# Patient Record
Sex: MALE | Race: BLACK OR AFRICAN AMERICAN | NOT HISPANIC OR LATINO | Employment: OTHER | URBAN - METROPOLITAN AREA
[De-identification: names, ages, dates, MRNs, and addresses within clinical notes are randomized per-mention and may not be internally consistent; named-entity substitution may affect disease eponyms.]

---

## 2021-09-07 DIAGNOSIS — R53.1 WEAKNESS GENERALIZED: Primary | ICD-10-CM

## 2021-09-30 ENCOUNTER — CLINICAL SUPPORT (OUTPATIENT)
Dept: REHABILITATION | Facility: HOSPITAL | Age: 61
End: 2021-09-30
Attending: NURSE PRACTITIONER
Payer: MEDICAID

## 2021-09-30 DIAGNOSIS — R53.1 WEAKNESS GENERALIZED: ICD-10-CM

## 2021-09-30 DIAGNOSIS — M25.60 DECREASED RANGE OF MOTION: ICD-10-CM

## 2021-09-30 DIAGNOSIS — M25.511 CHRONIC PAIN OF BOTH SHOULDERS: ICD-10-CM

## 2021-09-30 DIAGNOSIS — R29.898 DECREASED GRIP STRENGTH: ICD-10-CM

## 2021-09-30 DIAGNOSIS — G89.29 CHRONIC PAIN OF BOTH SHOULDERS: ICD-10-CM

## 2021-09-30 DIAGNOSIS — M25.512 CHRONIC PAIN OF BOTH SHOULDERS: ICD-10-CM

## 2021-09-30 PROCEDURE — 97110 THERAPEUTIC EXERCISES: CPT

## 2021-09-30 PROCEDURE — 97167 OT EVAL HIGH COMPLEX 60 MIN: CPT | Performed by: OCCUPATIONAL THERAPIST

## 2021-09-30 PROCEDURE — 97161 PT EVAL LOW COMPLEX 20 MIN: CPT

## 2021-10-06 ENCOUNTER — CLINICAL SUPPORT (OUTPATIENT)
Dept: REHABILITATION | Facility: HOSPITAL | Age: 61
End: 2021-10-06
Payer: MEDICAID

## 2021-10-06 DIAGNOSIS — Z74.09 DECREASED STRENGTH, ENDURANCE, AND MOBILITY: ICD-10-CM

## 2021-10-06 DIAGNOSIS — R53.1 DECREASED STRENGTH, ENDURANCE, AND MOBILITY: ICD-10-CM

## 2021-10-06 DIAGNOSIS — R68.89 DECREASED STRENGTH, ENDURANCE, AND MOBILITY: ICD-10-CM

## 2021-10-06 PROCEDURE — 97110 THERAPEUTIC EXERCISES: CPT

## 2021-10-12 ENCOUNTER — CLINICAL SUPPORT (OUTPATIENT)
Dept: REHABILITATION | Facility: HOSPITAL | Age: 61
End: 2021-10-12
Payer: MEDICAID

## 2021-10-12 DIAGNOSIS — G89.29 CHRONIC PAIN OF BOTH SHOULDERS: ICD-10-CM

## 2021-10-12 DIAGNOSIS — R29.898 DECREASED GRIP STRENGTH: ICD-10-CM

## 2021-10-12 DIAGNOSIS — M25.511 CHRONIC PAIN OF BOTH SHOULDERS: ICD-10-CM

## 2021-10-12 DIAGNOSIS — M25.512 CHRONIC PAIN OF BOTH SHOULDERS: ICD-10-CM

## 2021-10-12 DIAGNOSIS — M25.60 DECREASED RANGE OF MOTION: ICD-10-CM

## 2021-10-12 DIAGNOSIS — R53.1 DECREASED STRENGTH, ENDURANCE, AND MOBILITY: ICD-10-CM

## 2021-10-12 DIAGNOSIS — R68.89 DECREASED STRENGTH, ENDURANCE, AND MOBILITY: ICD-10-CM

## 2021-10-12 DIAGNOSIS — Z74.09 DECREASED STRENGTH, ENDURANCE, AND MOBILITY: ICD-10-CM

## 2021-10-12 PROCEDURE — 97110 THERAPEUTIC EXERCISES: CPT | Performed by: OCCUPATIONAL THERAPIST

## 2021-10-12 PROCEDURE — 97530 THERAPEUTIC ACTIVITIES: CPT | Performed by: OCCUPATIONAL THERAPIST

## 2021-10-13 ENCOUNTER — CLINICAL SUPPORT (OUTPATIENT)
Dept: REHABILITATION | Facility: HOSPITAL | Age: 61
End: 2021-10-13
Payer: MEDICAID

## 2021-10-13 DIAGNOSIS — R68.89 DECREASED STRENGTH, ENDURANCE, AND MOBILITY: ICD-10-CM

## 2021-10-13 DIAGNOSIS — R53.1 DECREASED STRENGTH, ENDURANCE, AND MOBILITY: ICD-10-CM

## 2021-10-13 DIAGNOSIS — Z74.09 DECREASED STRENGTH, ENDURANCE, AND MOBILITY: ICD-10-CM

## 2021-10-13 PROCEDURE — 97110 THERAPEUTIC EXERCISES: CPT

## 2021-10-15 ENCOUNTER — CLINICAL SUPPORT (OUTPATIENT)
Dept: REHABILITATION | Facility: HOSPITAL | Age: 61
End: 2021-10-15
Payer: MEDICAID

## 2021-10-15 DIAGNOSIS — G89.29 CHRONIC PAIN OF BOTH SHOULDERS: ICD-10-CM

## 2021-10-15 DIAGNOSIS — R53.1 DECREASED STRENGTH, ENDURANCE, AND MOBILITY: ICD-10-CM

## 2021-10-15 DIAGNOSIS — M25.511 CHRONIC PAIN OF BOTH SHOULDERS: ICD-10-CM

## 2021-10-15 DIAGNOSIS — M25.60 DECREASED RANGE OF MOTION: ICD-10-CM

## 2021-10-15 DIAGNOSIS — R68.89 DECREASED STRENGTH, ENDURANCE, AND MOBILITY: ICD-10-CM

## 2021-10-15 DIAGNOSIS — M25.512 CHRONIC PAIN OF BOTH SHOULDERS: ICD-10-CM

## 2021-10-15 DIAGNOSIS — R29.898 DECREASED GRIP STRENGTH: ICD-10-CM

## 2021-10-15 DIAGNOSIS — Z74.09 DECREASED STRENGTH, ENDURANCE, AND MOBILITY: ICD-10-CM

## 2021-10-15 PROCEDURE — 97110 THERAPEUTIC EXERCISES: CPT | Performed by: OCCUPATIONAL THERAPIST

## 2021-10-15 PROCEDURE — 97530 THERAPEUTIC ACTIVITIES: CPT | Performed by: OCCUPATIONAL THERAPIST

## 2021-10-18 ENCOUNTER — CLINICAL SUPPORT (OUTPATIENT)
Dept: REHABILITATION | Facility: HOSPITAL | Age: 61
End: 2021-10-18
Payer: MEDICAID

## 2021-10-18 DIAGNOSIS — Z74.09 DECREASED STRENGTH, ENDURANCE, AND MOBILITY: ICD-10-CM

## 2021-10-18 DIAGNOSIS — M25.511 CHRONIC PAIN OF BOTH SHOULDERS: ICD-10-CM

## 2021-10-18 DIAGNOSIS — R53.1 DECREASED STRENGTH, ENDURANCE, AND MOBILITY: ICD-10-CM

## 2021-10-18 DIAGNOSIS — R68.89 DECREASED STRENGTH, ENDURANCE, AND MOBILITY: ICD-10-CM

## 2021-10-18 DIAGNOSIS — G89.29 CHRONIC PAIN OF BOTH SHOULDERS: ICD-10-CM

## 2021-10-18 DIAGNOSIS — M25.60 DECREASED RANGE OF MOTION: ICD-10-CM

## 2021-10-18 DIAGNOSIS — R29.898 DECREASED GRIP STRENGTH: ICD-10-CM

## 2021-10-18 DIAGNOSIS — M25.512 CHRONIC PAIN OF BOTH SHOULDERS: ICD-10-CM

## 2021-10-18 PROCEDURE — 97530 THERAPEUTIC ACTIVITIES: CPT | Performed by: OCCUPATIONAL THERAPIST

## 2021-10-18 PROCEDURE — 97110 THERAPEUTIC EXERCISES: CPT

## 2021-10-20 ENCOUNTER — CLINICAL SUPPORT (OUTPATIENT)
Dept: REHABILITATION | Facility: HOSPITAL | Age: 61
End: 2021-10-20
Payer: MEDICAID

## 2021-10-20 DIAGNOSIS — Z74.09 DECREASED STRENGTH, ENDURANCE, AND MOBILITY: ICD-10-CM

## 2021-10-20 DIAGNOSIS — R53.1 DECREASED STRENGTH, ENDURANCE, AND MOBILITY: ICD-10-CM

## 2021-10-20 DIAGNOSIS — R68.89 DECREASED STRENGTH, ENDURANCE, AND MOBILITY: ICD-10-CM

## 2021-10-20 PROCEDURE — 97110 THERAPEUTIC EXERCISES: CPT

## 2021-10-26 ENCOUNTER — CLINICAL SUPPORT (OUTPATIENT)
Dept: REHABILITATION | Facility: HOSPITAL | Age: 61
End: 2021-10-26
Payer: MEDICAID

## 2021-10-26 DIAGNOSIS — R53.1 DECREASED STRENGTH, ENDURANCE, AND MOBILITY: ICD-10-CM

## 2021-10-26 DIAGNOSIS — Z74.09 DECREASED STRENGTH, ENDURANCE, AND MOBILITY: ICD-10-CM

## 2021-10-26 DIAGNOSIS — R68.89 DECREASED STRENGTH, ENDURANCE, AND MOBILITY: ICD-10-CM

## 2021-10-26 PROCEDURE — 97110 THERAPEUTIC EXERCISES: CPT

## 2021-10-27 ENCOUNTER — CLINICAL SUPPORT (OUTPATIENT)
Dept: REHABILITATION | Facility: HOSPITAL | Age: 61
End: 2021-10-27
Payer: MEDICAID

## 2021-10-27 DIAGNOSIS — R53.1 DECREASED STRENGTH, ENDURANCE, AND MOBILITY: ICD-10-CM

## 2021-10-27 DIAGNOSIS — G89.29 CHRONIC PAIN OF BOTH SHOULDERS: ICD-10-CM

## 2021-10-27 DIAGNOSIS — R68.89 DECREASED STRENGTH, ENDURANCE, AND MOBILITY: ICD-10-CM

## 2021-10-27 DIAGNOSIS — Z74.09 DECREASED STRENGTH, ENDURANCE, AND MOBILITY: ICD-10-CM

## 2021-10-27 DIAGNOSIS — M25.60 DECREASED RANGE OF MOTION: ICD-10-CM

## 2021-10-27 DIAGNOSIS — R29.898 DECREASED GRIP STRENGTH: ICD-10-CM

## 2021-10-27 DIAGNOSIS — M25.511 CHRONIC PAIN OF BOTH SHOULDERS: ICD-10-CM

## 2021-10-27 DIAGNOSIS — M25.512 CHRONIC PAIN OF BOTH SHOULDERS: ICD-10-CM

## 2021-10-27 PROCEDURE — 97140 MANUAL THERAPY 1/> REGIONS: CPT | Performed by: OCCUPATIONAL THERAPIST

## 2021-10-27 PROCEDURE — 97110 THERAPEUTIC EXERCISES: CPT | Performed by: OCCUPATIONAL THERAPIST

## 2021-10-29 ENCOUNTER — CLINICAL SUPPORT (OUTPATIENT)
Dept: REHABILITATION | Facility: HOSPITAL | Age: 61
End: 2021-10-29
Payer: MEDICAID

## 2021-10-29 DIAGNOSIS — R53.1 DECREASED STRENGTH, ENDURANCE, AND MOBILITY: ICD-10-CM

## 2021-10-29 DIAGNOSIS — R29.898 DECREASED GRIP STRENGTH: ICD-10-CM

## 2021-10-29 DIAGNOSIS — G89.29 CHRONIC PAIN OF BOTH SHOULDERS: ICD-10-CM

## 2021-10-29 DIAGNOSIS — M25.60 DECREASED RANGE OF MOTION: ICD-10-CM

## 2021-10-29 DIAGNOSIS — M25.511 CHRONIC PAIN OF BOTH SHOULDERS: ICD-10-CM

## 2021-10-29 DIAGNOSIS — Z74.09 DECREASED STRENGTH, ENDURANCE, AND MOBILITY: ICD-10-CM

## 2021-10-29 DIAGNOSIS — R68.89 DECREASED STRENGTH, ENDURANCE, AND MOBILITY: ICD-10-CM

## 2021-10-29 DIAGNOSIS — M25.512 CHRONIC PAIN OF BOTH SHOULDERS: ICD-10-CM

## 2021-10-29 PROCEDURE — 97530 THERAPEUTIC ACTIVITIES: CPT | Performed by: OCCUPATIONAL THERAPIST

## 2021-10-29 PROCEDURE — 97110 THERAPEUTIC EXERCISES: CPT | Performed by: OCCUPATIONAL THERAPIST

## 2021-11-08 ENCOUNTER — CLINICAL SUPPORT (OUTPATIENT)
Dept: REHABILITATION | Facility: HOSPITAL | Age: 61
End: 2021-11-08
Payer: MEDICAID

## 2021-11-08 DIAGNOSIS — R53.1 DECREASED STRENGTH, ENDURANCE, AND MOBILITY: ICD-10-CM

## 2021-11-08 DIAGNOSIS — M25.511 CHRONIC PAIN OF BOTH SHOULDERS: ICD-10-CM

## 2021-11-08 DIAGNOSIS — M25.60 DECREASED RANGE OF MOTION: ICD-10-CM

## 2021-11-08 DIAGNOSIS — R68.89 DECREASED STRENGTH, ENDURANCE, AND MOBILITY: ICD-10-CM

## 2021-11-08 DIAGNOSIS — Z74.09 DECREASED STRENGTH, ENDURANCE, AND MOBILITY: ICD-10-CM

## 2021-11-08 DIAGNOSIS — R29.898 DECREASED GRIP STRENGTH: ICD-10-CM

## 2021-11-08 DIAGNOSIS — M25.512 CHRONIC PAIN OF BOTH SHOULDERS: ICD-10-CM

## 2021-11-08 DIAGNOSIS — G89.29 CHRONIC PAIN OF BOTH SHOULDERS: ICD-10-CM

## 2021-11-08 PROCEDURE — 97110 THERAPEUTIC EXERCISES: CPT

## 2021-11-08 PROCEDURE — 97530 THERAPEUTIC ACTIVITIES: CPT | Performed by: OCCUPATIONAL THERAPIST

## 2021-11-10 ENCOUNTER — CLINICAL SUPPORT (OUTPATIENT)
Dept: REHABILITATION | Facility: HOSPITAL | Age: 61
End: 2021-11-10
Payer: MEDICAID

## 2021-11-10 DIAGNOSIS — R53.1 DECREASED STRENGTH, ENDURANCE, AND MOBILITY: ICD-10-CM

## 2021-11-10 DIAGNOSIS — R68.89 DECREASED STRENGTH, ENDURANCE, AND MOBILITY: ICD-10-CM

## 2021-11-10 DIAGNOSIS — M25.512 CHRONIC PAIN OF BOTH SHOULDERS: ICD-10-CM

## 2021-11-10 DIAGNOSIS — M25.511 CHRONIC PAIN OF BOTH SHOULDERS: ICD-10-CM

## 2021-11-10 DIAGNOSIS — G89.29 CHRONIC PAIN OF BOTH SHOULDERS: ICD-10-CM

## 2021-11-10 DIAGNOSIS — Z74.09 DECREASED STRENGTH, ENDURANCE, AND MOBILITY: ICD-10-CM

## 2021-11-10 DIAGNOSIS — M25.60 DECREASED RANGE OF MOTION: ICD-10-CM

## 2021-11-10 DIAGNOSIS — R29.898 DECREASED GRIP STRENGTH: ICD-10-CM

## 2021-11-10 PROCEDURE — 97110 THERAPEUTIC EXERCISES: CPT

## 2021-11-10 PROCEDURE — 97530 THERAPEUTIC ACTIVITIES: CPT | Performed by: OCCUPATIONAL THERAPIST

## 2021-11-10 PROCEDURE — 97112 NEUROMUSCULAR REEDUCATION: CPT

## 2021-11-15 ENCOUNTER — CLINICAL SUPPORT (OUTPATIENT)
Dept: REHABILITATION | Facility: HOSPITAL | Age: 61
End: 2021-11-15
Payer: MEDICAID

## 2021-11-15 DIAGNOSIS — M25.512 CHRONIC PAIN OF BOTH SHOULDERS: ICD-10-CM

## 2021-11-15 DIAGNOSIS — M25.511 CHRONIC PAIN OF BOTH SHOULDERS: ICD-10-CM

## 2021-11-15 DIAGNOSIS — R68.89 DECREASED STRENGTH, ENDURANCE, AND MOBILITY: ICD-10-CM

## 2021-11-15 DIAGNOSIS — Z74.09 DECREASED STRENGTH, ENDURANCE, AND MOBILITY: ICD-10-CM

## 2021-11-15 DIAGNOSIS — M25.60 DECREASED RANGE OF MOTION: ICD-10-CM

## 2021-11-15 DIAGNOSIS — G89.29 CHRONIC PAIN OF BOTH SHOULDERS: ICD-10-CM

## 2021-11-15 DIAGNOSIS — R53.1 DECREASED STRENGTH, ENDURANCE, AND MOBILITY: ICD-10-CM

## 2021-11-15 DIAGNOSIS — R29.898 DECREASED GRIP STRENGTH: ICD-10-CM

## 2021-11-15 PROCEDURE — 97112 NEUROMUSCULAR REEDUCATION: CPT

## 2021-11-15 PROCEDURE — 97530 THERAPEUTIC ACTIVITIES: CPT | Performed by: OCCUPATIONAL THERAPIST

## 2021-11-15 PROCEDURE — 97110 THERAPEUTIC EXERCISES: CPT

## 2021-11-17 ENCOUNTER — CLINICAL SUPPORT (OUTPATIENT)
Dept: REHABILITATION | Facility: HOSPITAL | Age: 61
End: 2021-11-17
Payer: MEDICAID

## 2021-11-17 DIAGNOSIS — M25.512 CHRONIC PAIN OF BOTH SHOULDERS: ICD-10-CM

## 2021-11-17 DIAGNOSIS — R68.89 DECREASED STRENGTH, ENDURANCE, AND MOBILITY: ICD-10-CM

## 2021-11-17 DIAGNOSIS — Z74.09 DECREASED STRENGTH, ENDURANCE, AND MOBILITY: ICD-10-CM

## 2021-11-17 DIAGNOSIS — R53.1 DECREASED STRENGTH, ENDURANCE, AND MOBILITY: ICD-10-CM

## 2021-11-17 DIAGNOSIS — M25.60 DECREASED RANGE OF MOTION: ICD-10-CM

## 2021-11-17 DIAGNOSIS — M25.511 CHRONIC PAIN OF BOTH SHOULDERS: ICD-10-CM

## 2021-11-17 DIAGNOSIS — R29.898 DECREASED GRIP STRENGTH: ICD-10-CM

## 2021-11-17 DIAGNOSIS — G89.29 CHRONIC PAIN OF BOTH SHOULDERS: ICD-10-CM

## 2021-11-17 PROCEDURE — 97112 NEUROMUSCULAR REEDUCATION: CPT

## 2021-11-17 PROCEDURE — 97530 THERAPEUTIC ACTIVITIES: CPT | Performed by: OCCUPATIONAL THERAPIST

## 2021-11-17 PROCEDURE — 97110 THERAPEUTIC EXERCISES: CPT

## 2021-11-22 ENCOUNTER — CLINICAL SUPPORT (OUTPATIENT)
Dept: REHABILITATION | Facility: HOSPITAL | Age: 61
End: 2021-11-22
Payer: MEDICAID

## 2021-11-22 DIAGNOSIS — M25.512 CHRONIC PAIN OF BOTH SHOULDERS: ICD-10-CM

## 2021-11-22 DIAGNOSIS — R29.898 DECREASED GRIP STRENGTH: ICD-10-CM

## 2021-11-22 DIAGNOSIS — R53.1 DECREASED STRENGTH, ENDURANCE, AND MOBILITY: ICD-10-CM

## 2021-11-22 DIAGNOSIS — R68.89 DECREASED STRENGTH, ENDURANCE, AND MOBILITY: ICD-10-CM

## 2021-11-22 DIAGNOSIS — Z74.09 DECREASED STRENGTH, ENDURANCE, AND MOBILITY: ICD-10-CM

## 2021-11-22 DIAGNOSIS — M25.511 CHRONIC PAIN OF BOTH SHOULDERS: ICD-10-CM

## 2021-11-22 DIAGNOSIS — M25.60 DECREASED RANGE OF MOTION: ICD-10-CM

## 2021-11-22 DIAGNOSIS — G89.29 CHRONIC PAIN OF BOTH SHOULDERS: ICD-10-CM

## 2021-11-22 PROCEDURE — 97110 THERAPEUTIC EXERCISES: CPT | Performed by: OCCUPATIONAL THERAPIST

## 2021-11-22 PROCEDURE — 97140 MANUAL THERAPY 1/> REGIONS: CPT | Performed by: OCCUPATIONAL THERAPIST

## 2021-11-24 ENCOUNTER — CLINICAL SUPPORT (OUTPATIENT)
Dept: REHABILITATION | Facility: HOSPITAL | Age: 61
End: 2021-11-24
Payer: MEDICAID

## 2021-11-24 DIAGNOSIS — G89.29 CHRONIC PAIN OF BOTH SHOULDERS: ICD-10-CM

## 2021-11-24 DIAGNOSIS — R29.898 DECREASED GRIP STRENGTH: ICD-10-CM

## 2021-11-24 DIAGNOSIS — M25.512 CHRONIC PAIN OF BOTH SHOULDERS: ICD-10-CM

## 2021-11-24 DIAGNOSIS — M25.60 DECREASED RANGE OF MOTION: ICD-10-CM

## 2021-11-24 DIAGNOSIS — Z74.09 DECREASED STRENGTH, ENDURANCE, AND MOBILITY: ICD-10-CM

## 2021-11-24 DIAGNOSIS — R53.1 DECREASED STRENGTH, ENDURANCE, AND MOBILITY: ICD-10-CM

## 2021-11-24 DIAGNOSIS — R68.89 DECREASED STRENGTH, ENDURANCE, AND MOBILITY: ICD-10-CM

## 2021-11-24 DIAGNOSIS — M25.511 CHRONIC PAIN OF BOTH SHOULDERS: ICD-10-CM

## 2021-11-24 PROCEDURE — 97530 THERAPEUTIC ACTIVITIES: CPT | Performed by: OCCUPATIONAL THERAPIST

## 2021-11-24 PROCEDURE — 97112 NEUROMUSCULAR REEDUCATION: CPT

## 2021-11-24 PROCEDURE — 97110 THERAPEUTIC EXERCISES: CPT

## 2021-11-29 ENCOUNTER — DOCUMENTATION ONLY (OUTPATIENT)
Dept: REHABILITATION | Facility: HOSPITAL | Age: 61
End: 2021-11-29
Payer: MEDICAID

## 2021-12-01 ENCOUNTER — CLINICAL SUPPORT (OUTPATIENT)
Dept: REHABILITATION | Facility: HOSPITAL | Age: 61
End: 2021-12-01
Payer: MEDICAID

## 2021-12-01 DIAGNOSIS — R53.1 DECREASED STRENGTH, ENDURANCE, AND MOBILITY: ICD-10-CM

## 2021-12-01 DIAGNOSIS — M25.512 CHRONIC PAIN OF BOTH SHOULDERS: ICD-10-CM

## 2021-12-01 DIAGNOSIS — G89.29 CHRONIC PAIN OF BOTH SHOULDERS: ICD-10-CM

## 2021-12-01 DIAGNOSIS — Z74.09 DECREASED STRENGTH, ENDURANCE, AND MOBILITY: ICD-10-CM

## 2021-12-01 DIAGNOSIS — R29.898 DECREASED GRIP STRENGTH: ICD-10-CM

## 2021-12-01 DIAGNOSIS — M25.511 CHRONIC PAIN OF BOTH SHOULDERS: ICD-10-CM

## 2021-12-01 DIAGNOSIS — M25.60 DECREASED RANGE OF MOTION: ICD-10-CM

## 2021-12-01 DIAGNOSIS — R68.89 DECREASED STRENGTH, ENDURANCE, AND MOBILITY: ICD-10-CM

## 2021-12-01 PROCEDURE — 97530 THERAPEUTIC ACTIVITIES: CPT | Performed by: OCCUPATIONAL THERAPIST

## 2021-12-06 ENCOUNTER — CLINICAL SUPPORT (OUTPATIENT)
Dept: REHABILITATION | Facility: HOSPITAL | Age: 61
End: 2021-12-06
Payer: MEDICAID

## 2021-12-06 DIAGNOSIS — G89.29 CHRONIC PAIN OF BOTH SHOULDERS: ICD-10-CM

## 2021-12-06 DIAGNOSIS — M25.512 CHRONIC PAIN OF BOTH SHOULDERS: ICD-10-CM

## 2021-12-06 DIAGNOSIS — M25.511 CHRONIC PAIN OF BOTH SHOULDERS: ICD-10-CM

## 2021-12-06 DIAGNOSIS — Z74.09 DECREASED STRENGTH, ENDURANCE, AND MOBILITY: ICD-10-CM

## 2021-12-06 DIAGNOSIS — R53.1 DECREASED STRENGTH, ENDURANCE, AND MOBILITY: ICD-10-CM

## 2021-12-06 DIAGNOSIS — M25.60 DECREASED RANGE OF MOTION: ICD-10-CM

## 2021-12-06 DIAGNOSIS — R29.898 DECREASED GRIP STRENGTH: ICD-10-CM

## 2021-12-06 DIAGNOSIS — R68.89 DECREASED STRENGTH, ENDURANCE, AND MOBILITY: ICD-10-CM

## 2021-12-06 PROCEDURE — 97530 THERAPEUTIC ACTIVITIES: CPT | Performed by: OCCUPATIONAL THERAPIST

## 2021-12-06 PROCEDURE — 97110 THERAPEUTIC EXERCISES: CPT | Performed by: OCCUPATIONAL THERAPIST

## 2021-12-06 PROCEDURE — 97140 MANUAL THERAPY 1/> REGIONS: CPT | Performed by: OCCUPATIONAL THERAPIST

## 2021-12-08 ENCOUNTER — DOCUMENTATION ONLY (OUTPATIENT)
Dept: REHABILITATION | Facility: HOSPITAL | Age: 61
End: 2021-12-08
Payer: MEDICAID

## 2021-12-10 ENCOUNTER — CLINICAL SUPPORT (OUTPATIENT)
Dept: REHABILITATION | Facility: HOSPITAL | Age: 61
End: 2021-12-10
Payer: MEDICAID

## 2021-12-10 DIAGNOSIS — Z74.09 DECREASED STRENGTH, ENDURANCE, AND MOBILITY: ICD-10-CM

## 2021-12-10 DIAGNOSIS — R68.89 DECREASED STRENGTH, ENDURANCE, AND MOBILITY: ICD-10-CM

## 2021-12-10 DIAGNOSIS — R53.1 DECREASED STRENGTH, ENDURANCE, AND MOBILITY: ICD-10-CM

## 2021-12-10 PROCEDURE — 97112 NEUROMUSCULAR REEDUCATION: CPT

## 2021-12-10 PROCEDURE — 97110 THERAPEUTIC EXERCISES: CPT

## 2021-12-15 ENCOUNTER — CLINICAL SUPPORT (OUTPATIENT)
Dept: REHABILITATION | Facility: HOSPITAL | Age: 61
End: 2021-12-15
Payer: MEDICAID

## 2021-12-15 DIAGNOSIS — M25.60 DECREASED RANGE OF MOTION: ICD-10-CM

## 2021-12-15 DIAGNOSIS — R29.898 DECREASED GRIP STRENGTH: ICD-10-CM

## 2021-12-15 DIAGNOSIS — M25.512 CHRONIC PAIN OF BOTH SHOULDERS: ICD-10-CM

## 2021-12-15 DIAGNOSIS — R53.1 DECREASED STRENGTH, ENDURANCE, AND MOBILITY: ICD-10-CM

## 2021-12-15 DIAGNOSIS — G89.29 CHRONIC PAIN OF BOTH SHOULDERS: ICD-10-CM

## 2021-12-15 DIAGNOSIS — R68.89 DECREASED STRENGTH, ENDURANCE, AND MOBILITY: ICD-10-CM

## 2021-12-15 DIAGNOSIS — M25.511 CHRONIC PAIN OF BOTH SHOULDERS: ICD-10-CM

## 2021-12-15 DIAGNOSIS — Z74.09 DECREASED STRENGTH, ENDURANCE, AND MOBILITY: ICD-10-CM

## 2021-12-15 PROCEDURE — 97530 THERAPEUTIC ACTIVITIES: CPT | Performed by: OCCUPATIONAL THERAPIST

## 2021-12-15 PROCEDURE — 97110 THERAPEUTIC EXERCISES: CPT | Performed by: OCCUPATIONAL THERAPIST

## 2021-12-15 PROCEDURE — 97168 OT RE-EVAL EST PLAN CARE: CPT | Performed by: OCCUPATIONAL THERAPIST

## 2021-12-20 ENCOUNTER — CLINICAL SUPPORT (OUTPATIENT)
Dept: REHABILITATION | Facility: HOSPITAL | Age: 61
End: 2021-12-20
Payer: MEDICAID

## 2021-12-20 DIAGNOSIS — R68.89 DECREASED STRENGTH, ENDURANCE, AND MOBILITY: ICD-10-CM

## 2021-12-20 DIAGNOSIS — M25.60 DECREASED RANGE OF MOTION: ICD-10-CM

## 2021-12-20 DIAGNOSIS — M25.511 CHRONIC PAIN OF BOTH SHOULDERS: ICD-10-CM

## 2021-12-20 DIAGNOSIS — R53.1 DECREASED STRENGTH, ENDURANCE, AND MOBILITY: ICD-10-CM

## 2021-12-20 DIAGNOSIS — G89.29 CHRONIC PAIN OF BOTH SHOULDERS: ICD-10-CM

## 2021-12-20 DIAGNOSIS — Z74.09 DECREASED STRENGTH, ENDURANCE, AND MOBILITY: ICD-10-CM

## 2021-12-20 DIAGNOSIS — R29.898 DECREASED GRIP STRENGTH: ICD-10-CM

## 2021-12-20 DIAGNOSIS — M25.512 CHRONIC PAIN OF BOTH SHOULDERS: ICD-10-CM

## 2021-12-20 PROCEDURE — 97110 THERAPEUTIC EXERCISES: CPT | Performed by: OCCUPATIONAL THERAPIST

## 2022-01-10 ENCOUNTER — CLINICAL SUPPORT (OUTPATIENT)
Dept: REHABILITATION | Facility: HOSPITAL | Age: 62
End: 2022-01-10
Payer: MEDICARE

## 2022-01-10 DIAGNOSIS — M25.511 CHRONIC PAIN OF BOTH SHOULDERS: ICD-10-CM

## 2022-01-10 DIAGNOSIS — M25.512 CHRONIC PAIN OF BOTH SHOULDERS: ICD-10-CM

## 2022-01-10 DIAGNOSIS — R68.89 DECREASED STRENGTH, ENDURANCE, AND MOBILITY: ICD-10-CM

## 2022-01-10 DIAGNOSIS — Z74.09 DECREASED STRENGTH, ENDURANCE, AND MOBILITY: ICD-10-CM

## 2022-01-10 DIAGNOSIS — R53.1 DECREASED STRENGTH, ENDURANCE, AND MOBILITY: ICD-10-CM

## 2022-01-10 DIAGNOSIS — M25.60 DECREASED RANGE OF MOTION: ICD-10-CM

## 2022-01-10 DIAGNOSIS — G89.29 CHRONIC PAIN OF BOTH SHOULDERS: ICD-10-CM

## 2022-01-10 DIAGNOSIS — R29.898 DECREASED GRIP STRENGTH: ICD-10-CM

## 2022-01-10 PROCEDURE — 97530 THERAPEUTIC ACTIVITIES: CPT | Performed by: OCCUPATIONAL THERAPIST

## 2022-01-10 NOTE — PROGRESS NOTES
OCCUPATIONAL THERAPY DAILY NOTE     Name: Kal Todd  [facility] Number: [id number]  Therapy Diagnosis:   Decreased range of motion  Decreased  strength  Chronic bilateral shoulder pain.     Physician: Bianka Gonzales NP     Physician Orders: Evaluation and treatment   Medical Diagnosis: R53.1 (ICD-10-CM) - Weakness generalized  Surgical Procedure and Date: Catherization, Dec.2020  Evaluation Date: 9/30/2021  Insurance Authorization Period Expiration: 10/12/2021 - 1/10/2022  Plan of Care Certification Period: 9/30/2021 to 12/30/2021  Date of Return to MD: None scheduled  FOTO not performed 0/3  PROGRESS NOTE DUE: 1/20/2022     Visit # / Visits authorized: 1/3  Time In: 0659  Time Out: 0759  Total Time: 60 minutes     Precautions:  History of heart attack and fall/safety precautions     SUBJECTIVE:  Patient reports that he has been doing his exercises for his shoulders. He was not exercising for a week secondary to his gout increased knee pain.     Functional usage:  No difficulty with bathing    Current pain: 0/10 bilateral shoulders   After treatment pain: none        TREATMENT   OBJECTIVE: updated on 1/10/2021     Wrist Ext/Flex: Right: 60/52  to 65/70    Left: 60/45     To    69/55  Wrist RD/UD: within normal limits /within normal limits   Supination/Pronation: within normal limits /within normal limits   Elbow extension/flexion: within normal limits /within normal limits   pola of Motion:   Right: limited as follows: (see measurements table below)  Left: limited as follows: (see measurements table below)         Strength  Shoulder Flexion RUE: 4/5 to 5/5   Shoulder Extension RUE: 5/5   Shoulder Abduction RUE:  4/5 to 5/5   Shoulder Adduction RUE:  5/5   Internal Rotation RUE: 5/5   External Rotation RUE: 5/5   Horizontal Adduction RUE: 5/5   Shoulder Flexion LUE: 4/5 to 5/5   Shoulder Extension LUE: 5/5   Shoulder Abduction LUE: 4/5 to 5/5   Shoulder Abbduction LUE: 5/5   Internal Rotation LUE:  5/5    External Rotation LUE:  5/5   Horizontal Adduction LUE:  5/5              (L) UE (R) UE       AROM AROM Norm   Shoulder Flexion     0-180   Shoulder Flexion 130 to 154 150 to 152 180   Shoulder Abduction 75 to 148 150 to 145 0-180   Shoulder Extension 50 50 0-50   Shoulder Internal Rotation 80 to 85 80 to 85 0-90   Shoulder External Rotation 50 to 55 60 to 65 0-90   Horz Shoulder Adduction 30 to 35 30 to 40 0-40      PASSIVE RANGE OF MOTION:  Shoulder flexion: right 165 to 153    Left: 159 to 160     Limitation of Functional Status as follows:              ADLs/IADLs:                           - Feeding: None                          - Bathing: Moderate difficulty reaching across his body to mild difficulty                          - Dressing/Grooming: unable to put on socks moderate difficulty to mild difficulty                          - Driving: No   Strength: (TESS Dynamometer in lbs.) Average 3 trials, Position II  Right: 29/29/48#  Improved to 55#  Left: 26/35/32#  Improved to 45#        Kal received therapeutic exercises to develop strength, endurance, ROM, flexibility and posture for 55 minutes including:     Exercise 12/15/2021   AAROM exercises:  Shoulder flexion in standing on corner of wall with dowel Shoulder external rotation with towel roll in standing  Shoulder extension    20-30x     Shoulder stretches: supine  Horizontal abduction  Horizontal adduction  Cross body  Behind the back stretch in standing with pulley  Shoulder flexion wall stretch not today   External rotation rotation at 90  Wrist extension stretches 3/30 second holds   Proximal interphalangeal joint stretches bilateral hands 10/10 second holds not today    Hook fist bilateral hands 10 repetitions not today    Flat fist to hook fist bilateral hands 10 reps. Not today    Passive range of motion bilateral wrists and shoulders 4/15 second holds all planes of motion   2 minutes each for stretching  Shoulder flexion  Shoulder  abduction Pulley exercises               Digit abduction/adduction with rubber band  Digit extension 10x 20x not today    Shoulder rows mid and low with green theraband  Shoulder punches with green theraband 20x each   Shoulder backward and forward [id number] each Not today    UBE 6 minutes 4.0 For endurance and warm up   Supine elbow extension with 3# weight 20x     Serratus punch 4# with dowel 20x      External/internal rotation with red theraband bilateral shoulders 20x     Wrist curls bilateral hand 2#     Forearm supination/pronation 2# 20x all planes not today    Opposition of the thumb to all digit           Kal received the following manual therapy techniques: Soft tissue Mobilization were applied to the: bilateral glenohmeral joint  for 0 minutes, including:  STM/IASTM of LEFT anterior deltoid not today   Long arm glenohmeral joint ossilations  Left glenohmeral joint mobilizations grade 2/3 inferior and posterior    Left wrist joint mobilization grade 2 mid carpal row anterior/posterior not today         Kal participated in dynamic functional therapeutic activities to improve functional performance for 5  minutes, including:     Exercise 12/15/2021         Dowel with rope 2# 3 minutes   Wrist wheel well for left flexion/extension  3 minutes each not today    Table top wrist and digit stretches 4/15 second holds not today    digi flexion green left hand and blue right hand 30x each n   Forearm rotation with hammer 2# 30x each   Ball rolls on wall for shoulder flexion stretches 20x not today    handmaster  20x not today          Kal received the following supervised modalities after being cleared for contradictions:      Kal received hot pack for 0 minutes to bilateral shoulders to increase tissue extensibility and decrease pain.     Kal received cold pack for 0 minutes.        Home Exercises Provided and Patient Education Provided      Education/Self-Care provided: (2 minutes) with  exercises  · Patient educated on biomechanical justification for therapeutic exercise and importance of compliance with HEP in order to improve overall impairments and QOL   · Patient educated on postural awareness and using his core and proximal shoulder muscles with activity and lifting.  · Patient educated on pain free stretching and decreased intensity of wrist stretches on table.     Written Home Exercises Provided: yes.  Exercises were reviewed and Kal was able to demonstrate them prior to the end of the session.  Kal demonstrated good  understanding of the education provided.      See EMR under Patient Instructions for exercises provided  1/10/2022.     ASSESSMENT    Pt tolerated exercise well with no reports of  increased pain. Pt demonstrated good understanding of exercises and required minimal cueing to maintain proper form.  Patient demonstrates increased shoulder   Kal is progressing well towards his goals.   Pt prognosis is Good.   Anticipated barriers to therapy: compliance with his home exercise program.  Patient would continue to benefit from therapy for bilateral upper extremity stretching and strengthening exercises and improving self care skills     Goals:   GOALS:      Short Term Goals:  6 weeks                                                                             Updated:  12/15/2021 1.   Pain: Pt will demonstrate improved pain by reports of less than or equal to 1/10 worst pain on the verbal rating scale in order to progress toward maximal functional ability and improve QOL.  Met 10/12/2021 2.   Mobility: Patient will improve AROM to 50% of stated goals, listed in objective measures above, in order to progress towards independence with functional activities. Patient will obtain full proximal interphalangeal joint flexion. Shoulder range of motion will increase by 5-10 degrees.  Met 10/12/2021 3.   Strength: Patient will improve strength to 50% of stated goals, listed in objective  measures above, in order to progress towards independence with functional activities.   Met 11/24/2021 4.   Self Care: Patient will demonstrate improved self care skills listed in objective measure above,in order to progress towards independence with functional activities.   Met 10/18/2021 5.   HEP: Patient will demonstrate independence with HEP in order to progress toward functional independence.  progressing      Long Term Goals:  12 weeks                                                                       Updated  12/15/2021 1.   Pain: Pt will demonstrate improved pain by reports of less than or equal to 0/10 worst pain on the verbal rating scale in order to progress toward maximal functional ability and improve QOL.    Met 11/24/2021 2.   Function: Patient will demonstrate improved function as indicated by a functional limitation score of less than or equal to 50 out of 100 on FOTO. Not performed 3.   Mobility: Patient will improve AROM to stated goals, listed in objective measures above, in order to return to maximal functional potential and improve quality of life.Patient will obtain full proximal interphalangeal joint flexion without pain.shoulder range of motion will increase by 10-20 degrees. progressing 4.   Strength: Patient will improve strength to stated goals, listed in objective measures above, in order to improve functional independence and quality of life.  Met 12/13/2021 5.   Self Care: Patient will demonstrate increased self care skills to an independent level or modified independent level with adaptive equipment as needed.  progressing 6.   Patient will return to normal ADL's, IADL's, community involvement, recreational activities, and work-related activities with less than or equal to 0/10 pain and maximal function.   progressing               PLAN   CONTINUE OUTPATIENT OCCUPATIONAL THERAPY 2X WEEK FOR 4 WEEKS FOR STRETCHING,STRENGTHENING AND POSTURE CONTROL.     WALTER Shukla, PAUL

## 2022-01-14 ENCOUNTER — CLINICAL SUPPORT (OUTPATIENT)
Dept: REHABILITATION | Facility: HOSPITAL | Age: 62
End: 2022-01-14
Payer: MEDICARE

## 2022-01-14 DIAGNOSIS — R68.89 DECREASED STRENGTH, ENDURANCE, AND MOBILITY: ICD-10-CM

## 2022-01-14 DIAGNOSIS — M25.511 CHRONIC PAIN OF BOTH SHOULDERS: ICD-10-CM

## 2022-01-14 DIAGNOSIS — M25.512 CHRONIC PAIN OF BOTH SHOULDERS: ICD-10-CM

## 2022-01-14 DIAGNOSIS — R29.898 DECREASED GRIP STRENGTH: ICD-10-CM

## 2022-01-14 DIAGNOSIS — M25.60 DECREASED RANGE OF MOTION: ICD-10-CM

## 2022-01-14 DIAGNOSIS — G89.29 CHRONIC PAIN OF BOTH SHOULDERS: ICD-10-CM

## 2022-01-14 DIAGNOSIS — R53.1 DECREASED STRENGTH, ENDURANCE, AND MOBILITY: ICD-10-CM

## 2022-01-14 DIAGNOSIS — Z74.09 DECREASED STRENGTH, ENDURANCE, AND MOBILITY: ICD-10-CM

## 2022-01-14 PROCEDURE — 97530 THERAPEUTIC ACTIVITIES: CPT | Performed by: OCCUPATIONAL THERAPIST

## 2022-01-14 PROCEDURE — 97110 THERAPEUTIC EXERCISES: CPT | Performed by: OCCUPATIONAL THERAPIST

## 2022-01-14 NOTE — PROGRESS NOTES
OCCUPATIONAL THERAPY DAILY NOTE/PLAN OF CARE     Name: Kal Todd  Clinic Number: [id number]  Therapy Diagnosis:   Decreased range of motion  Decreased  strength  Chronic bilateral shoulder pain.     Physician: Bianka Gonzales NP     Physician Orders: Evaluation and treatment   Medical Diagnosis: R53.1 (ICD-10-CM) - Weakness generalized  Surgical Procedure and Date: Catherization, Dec.2020  Evaluation Date: 9/30/2021  Insurance Authorization Period Expiration: 10/12/2021 - 1/10/2022  Plan of Care Certification Period: 1/14/2022 TO 2/14/2022  Date of Return to MD: None scheduled  FOTO not performed 0/3  PROGRESS NOTE DUE: 1/20/2022     Visit # / Visits authorized: 2/3  Time In: 0650  Time Out: 0750  Total Time: 60 minutes     Precautions:  History of heart attack and fall/safety precautions     SUBJECTIVE:  Patient reports no pain.     Functional usage:  No difficulty with bathing    Current pain: 0/10 bilateral shoulders   After treatment pain: bathing with no difficulty        TREATMENT   OBJECTIVE: updated on 1/14/2021     Wrist Ext/Flex: Right: 60/52  to 65/70    Left: 60/45     To    69/55  Wrist RD/UD: within normal limits /within normal limits   Supination/Pronation: within normal limits /within normal limits   Elbow extension/flexion: within normal limits /within normal limits   pola of Motion:   Right: limited as follows: (see measurements table below)  Left: limited as follows: (see measurements table below)         Strength  Shoulder Flexion RUE: 4/5 to 5/5   Shoulder Extension RUE: 5/5   Shoulder Abduction RUE:  4/5 to 5/5   Shoulder Adduction RUE:  5/5   Internal Rotation RUE: 5/5   External Rotation RUE: 5/5   Horizontal Adduction RUE: 5/5   Shoulder Flexion LUE: 4/5 to 5/5   Shoulder Extension LUE: 5/5   Shoulder Abduction LUE: 4/5 to 5/5   Shoulder Abbduction LUE: 5/5   Internal Rotation LUE:  5/5   External Rotation LUE:  5/5   Horizontal Adduction LUE:  5/5              (L) UE (R) UE        AROM AROM Norm   Shoulder Flexion     0-180   Shoulder Flexion 130 to 154 150 to 152 180   Shoulder Abduction 75 to 148 150 to 145 0-180   Shoulder Extension 50 50 0-50   Shoulder Internal Rotation 80 to 85 80 to 85 0-90   Shoulder External Rotation 50 to 55 60 to 65 0-90   Horz Shoulder Adduction 30 to 35 30 to 40 0-40      PASSIVE RANGE OF MOTION:  Shoulder flexion: right 165 to 153    Left: 159 to 160     Limitation of Functional Status as follows:              ADLs/IADLs:                           - Feeding: None                          - Bathing: Moderate difficulty reaching across his body to no difficulty                          - Dressing/Grooming: unable to put on socks moderate difficulty to mild difficulty                          - Driving: No   Strength: (TESS Dynamometer in lbs.) Average 3 trials, Position II  Right: 29/29/48#  Improved to 55#  Left: 26/35/32#  Improved to 45#        Kal received therapeutic exercises to develop strength, endurance, ROM, flexibility and posture for 52 minutes including:     Exercise 12/15/2021   AAROM exercises:  Shoulder flexion in standing on corner of wall with dowel Shoulder external rotation with towel roll in standing  Shoulder extension    20-30x     Shoulder stretches: supine  Horizontal abduction  Horizontal adduction  Cross body  Behind the back stretch in standing with pulley  Shoulder flexion wall stretch not today   External rotation rotation at 90  Wrist extension stretches 3/30 second holds   Proximal interphalangeal joint stretches bilateral hands 10/10 second holds not today    Hook fist bilateral hands 10 repetitions not today    Flat fist to hook fist bilateral hands 10 reps. Not today    Passive range of motion bilateral wrists and shoulders 4/15 second holds all planes of motion   2 minutes each for stretching  Shoulder flexion  Shoulder abduction Pulley exercises               Digit abduction/adduction with rubber band  Digit extension  10x 20x not today    Shoulder rows mid and low with green theraband  Shoulder punches with green theraband 20x each   Shoulder backward and forward [id number] each Not today    UBE 6 minutes 4.0 For endurance and warm up   Supine elbow extension with 3# weight 20x     Serratus punch 4# with dowel 20x      External/internal rotation with red theraband bilateral shoulders 20x     Wrist curls bilateral hand 2#     Forearm supination/pronation 2# 20x all planes not today    Opposition of the thumb to all digit                   Kal participated in dynamic functional therapeutic activities to improve functional performance for 8  minutes, including:     Exercise 12/15/2021         Dowel with rope 2# 3 minutes   Wrist wheel well for left flexion/extension  3 minutes each not today    Table top wrist and digit stretches 4/15 second holds not today    digi flexion green left hand and blue right hand 30x each n   Forearm rotation with  2# 30x each   Ball rolls on wall for shoulder flexion stretches 20x not today    handmaster  20x             Home Exercises Provided and Patient Education Provided      Education/Self-Care provided: (2 minutes) with exercises  · Patient educated on biomechanical justification for therapeutic exercise and importance of compliance with HEP in order to improve overall impairments and QOL   · Patient educated on postural awareness and using his core and proximal shoulder muscles with activity and lifting.  · Patient educated on wrist strengthening exercises.     Written Home Exercises Provided: yes.  Exercises were reviewed and Kal was able to demonstrate them prior to the end of the session.  Kal demonstrated good  understanding of the education provided.      See EMR under Patient Instructions for exercises provided  1/10/2022.     ASSESSMENT    Pt tolerated exercise well with no reports of  increased pain. Pt demonstrated good understanding of exercises and required minimal cueing to  maintain proper form.  Patient demonstrates decreased wrist stiffness and pain and increased range of motion. Wrist weakness noted. Patient reports no difficulty with self care tasks.Patient is doing well with his home program and ready for discharge next visit.    Kal is progressing well towards his goals.   Pt prognosis is Good.   Anticipated barriers to therapy: compliance with his home exercise program.  Patient would continue to benefit from therapy for bilateral upper extremity stretching and strengthening exercises and improving self care skills     Goals:   GOALS:      Short Term Goals:  6 weeks                                                                             Updated:  12/15/2021 1.   Pain: Pt will demonstrate improved pain by reports of less than or equal to 1/10 worst pain on the verbal rating scale in order to progress toward maximal functional ability and improve QOL.  Met 10/12/2021 2.   Mobility: Patient will improve AROM to 50% of stated goals, listed in objective measures above, in order to progress towards independence with functional activities. Patient will obtain full proximal interphalangeal joint flexion. Shoulder range of motion will increase by 5-10 degrees.  Met 10/12/2021 3.   Strength: Patient will improve strength to 50% of stated goals, listed in objective measures above, in order to progress towards independence with functional activities.   Met 11/24/2021 4.   Self Care: Patient will demonstrate improved self care skills listed in objective measure above,in order to progress towards independence with functional activities.   Met 10/18/2021 5.   HEP: Patient will demonstrate independence with HEP in order to progress toward functional independence.  progressing      Long Term Goals:  12 weeks                                                                       Updated  12/15/2021 1.   Pain: Pt will demonstrate improved pain by reports of less than or equal to 0/10 worst  pain on the verbal rating scale in order to progress toward maximal functional ability and improve QOL.    Met 11/24/2021 2.   Function: Patient will demonstrate improved function as indicated by a functional limitation score of less than or equal to 50 out of 100 on FOTO. Not performed 3.   Mobility: Patient will improve AROM to stated goals, listed in objective measures above, in order to return to maximal functional potential and improve quality of life.Patient will obtain full proximal interphalangeal joint flexion without pain.shoulder range of motion will increase by 10-20 degrees. progressing 4.   Strength: Patient will improve strength to stated goals, listed in objective measures above, in order to improve functional independence and quality of life.  Met 12/13/2021 5.   Self Care: Patient will demonstrate increased self care skills to an independent level or modified independent level with adaptive equipment as needed.  met 1/14/2022 6.   Patient will return to normal ADL's, IADL's, community involvement, recreational activities, and work-related activities with less than or equal to 0/10 pain and maximal function.   progressing               PLAN   CONTINUE OUTPATIENT OCCUPATIONAL THERAPY 1 more visit and discharge       WALTER Shukla, PAUL

## 2022-02-09 ENCOUNTER — DOCUMENTATION ONLY (OUTPATIENT)
Dept: REHABILITATION | Facility: HOSPITAL | Age: 62
End: 2022-02-09
Payer: MEDICARE

## 2022-02-09 NOTE — PROGRESS NOTES
[facility]  Occupational therapy  Discharge Note    Name: Kal Todd  Clinic Number: [id number]    Clinic Number: [id number]  Therapy Diagnosis:   Decreased range of motion  Decreased  strength  Chronic bilateral shoulder pain.     Physician: Bianka Gonzales NP     Physician Orders: Evaluation and treatment   Medical Diagnosis: R53.1 (ICD-10-CM) - Weakness generalized  Surgical Procedure and Date: Catherization, Dec.2020  Evaluation Date: 9/30/2021    Date of Last visit: 1/14/2022  Total Visits Received: 2    ASSESSMENT      Unable to reassess as patient did not return for treatment.    Discharge reason: Patient has not attended therapy since 1/14/2022.    Discharge FOTO Score: NA    GOALS:      Short Term Goals:  6 weeks                                                                             Updated:  12/15/2021   1. Pain: Pt will demonstrate improved pain by reports of less than or equal to 1/10 worst pain on the verbal rating scale in order to progress toward maximal functional ability and improve QOL.  Met 10/12/2021   2. Mobility: Patient will improve AROM to 50% of stated goals, listed in objective measures above, in order to progress towards independence with functional activities. Patient will obtain full proximal interphalangeal joint flexion. Shoulder range of motion will increase by 5-10 degrees.  Met 10/12/2021   3. Strength: Patient will improve strength to 50% of stated goals, listed in objective measures above, in order to progress towards independence with functional activities.   Met 11/24/2021   4. Self Care: Patient will demonstrate improved self care skills listed in objective measure above,in order to progress towards independence with functional activities.   Met 10/18/2021   5. HEP: Patient will demonstrate independence with HEP in order to progress toward functional independence.  progressing      Long Term Goals:  12 weeks                                                                        Updated  12/15/2021   1. Pain: Pt will demonstrate improved pain by reports of less than or equal to 0/10 worst pain on the verbal rating scale in order to progress toward maximal functional ability and improve QOL.    Met 11/24/2021   2. Function: Patient will demonstrate improved function as indicated by a functional limitation score of less than or equal to 50 out of 100 on FOTO. Not performed   3. Mobility: Patient will improve AROM to stated goals, listed in objective measures above, in order to return to maximal functional potential and improve quality of life.Patient will obtain full proximal interphalangeal joint flexion without pain.shoulder range of motion will increase by 10-20 degrees. progressing   4. Strength: Patient will improve strength to stated goals, listed in objective measures above, in order to improve functional independence and quality of life.  Met 12/13/2021   5. Self Care: Patient will demonstrate increased self care skills to an independent level or modified independent level with adaptive equipment as needed.  met 1/14/2022   6. Patient will return to normal ADL's, IADL's, community involvement, recreational activities, and work-related activities with less than or equal to 0/10 pain and maximal function.   progressing               PLAN   This patient is discharged from Occupational Therapy      WALTER Shukla

## 2022-03-02 DIAGNOSIS — R53.1 GENERALIZED WEAKNESS: Primary | ICD-10-CM

## 2022-03-09 ENCOUNTER — CLINICAL SUPPORT (OUTPATIENT)
Dept: REHABILITATION | Facility: HOSPITAL | Age: 62
End: 2022-03-09
Payer: MEDICARE

## 2022-03-09 DIAGNOSIS — R53.1 GENERALIZED WEAKNESS: ICD-10-CM

## 2022-03-09 PROCEDURE — 97161 PT EVAL LOW COMPLEX 20 MIN: CPT

## 2022-03-09 PROCEDURE — 97110 THERAPEUTIC EXERCISES: CPT

## 2022-03-09 NOTE — PLAN OF CARE
[facility]  Physical Therapy Initial Evaluation     Name: Kal Todd  Clinic Number: [id number]     Therapy Diagnosis:        Encounter Diagnosis   Name Primary?    Weakness generalized        Physician: Bianka Gonzales NP    Physician Orders: PT Eval and Treat  Medical Diagnosis from Referral: General muscle weakness  Evaluation Date: 9/30/2021  Authorization Period Expiration: 12/31/22  Plan of Care Expiration: 05/10/22                          Progress Update: 04/10/22                      FOTO: 1 / 3   Visit # / Visits authorized: 1 / 1                          PRECAUTIONS: Standard Precautions and Safety/fall precautions      MD Visit: No follow up scheduled      Time In: 1030  Time Out: 1100  Total Appointment Time (timed & untimed codes): 30 minutes     SUBJECTIVE      Date of onset: December 2021     History of current condition - Kal is a 61 y.o. male whom reports having a heart attack December 2020. After, the patient received home health services until April 2021. The patient self reports increase muscle weakness and balance deficits over the last 3 months. Unable to stand to complete task such as washing dishes/clothes and walking to the mailbox without multiple rest breaks. Kal's currently does not have an exercise regiment.  Seeking Physical Therapy to help regain independence.     CINDY: Heart attack   Falls: No  Physician Instructions (per patient): Follow up if needed         Imaging: No X-Ray at this time     Prior Therapy: Yes  Social History: Pt lives alone  · Living Environment: Home   · ADLs unable to complete: Wash dishes, wash clothes, and perform yard work.   · Gym/Home Equipment: No  Occupation: Pt is disabled   Prior Level of Function: Independent with all ADLs  Current Level of Function: 30% of PLOF     Pain:  Current 6 /10, worst 9 /10, best 0 /10   Location: Low back   Description: Aching, Dull, Throbbing, Tight, Tingling and Deep  Aggravating  Factors: Taking a shower   Easing Factors: Medication and rest      Dominant Extremity: Right     Pts goals: Pt reported goals are to be independent with ADLs such as washing dishes, cleaning the home, and yard work.      _______________________________________________________  Medical History:   No past medical history on file.     Surgical History:   Kal Todd  has no past surgical history on file.     Medications:   Kal currently has no medications in their medication list.     Allergies:   Review of patient's allergies indicates:  Not on File      OBJECTIVE   (x = not tested due to pain and/or inability to obtain test position)     RANGE OF MOTION:     Lumbar AROM/PROM Right  (spine)  9/30/2021 Left     9/30/2021 Goal   Lumbar Flexion (60) 50% --- 55      Lumbar Extension (30) 50% --- 30      Lumbar Side Bending (25) 75% 75% 20      Lumbar Rotation 75% 75% Pain Free      Hip AROM/PROM Right  9/30/2021 Left  9/30/2021 Goal   Hip Flexion (120) 110 110 115      Hip IR (45) 10 10 40      Hip ER (45) 30 25 40         Knee AROM/PROM Right  9/30/2021 Left  9/30/2021 Goal   Hyper - Zero - Flexion 0-0-125 0-0-125 0-0-135  Initial:       Ankle/Foot AROM/PROM Right  9/30/2021 Left  9/30/2021 Goal   Dorsiflexion (20) 10 10 15                                STRENGTH:     L/E MMT Right  9/30/2021 Goal   Hip Flexion  3+/5 5/5 B    Hip Extension  3+/5 5/5 B   Hip Abduction  4/5 5/5 B   Hip IR 3/5 5/5 B   Hip ER 4/5 5/5 B   Knee Flexion 4/5 5/5 B   Knee Extension 4/5 5/5 B   Ankle DF 4/5 5/5 B                                      L/E MMT Left  9/30/2021 Goal   Hip Flexion  3/5 5/5 B    Hip Extension  3+/5 5/5 B   Hip Abduction  4/5 5/5 B   Hip IR 3/5 5/5 B   Hip ER 4/5 5/5 B   Knee Flexion 4/5 5/5 B   Knee Extension 4/5 5/5 B   Ankle DF 4/5 5/5 B                                         Sensation:  Sensation is intact to light touch     Posture:  Pt presents with postural abnormalities which include: forward head,  rounded shoulders and ankle/foot pronation     Forward Round Rounded Shoulder and Increased Thoracic Kyphosis     Gait Analysis: The patient ambulated with the following assistive device: none; the pt presents with the following gait abnormalities: decreased step length, wayne, and arm swing; increased forward flexed posture, trunk sway.     Movement Analysis:   Functional Test  Outcome   Double Leg Squat  One arm required to maintain balance    Single Leg Step Down        Rehab Tests  Outcome Norms GOAL   Timed Up and Go  x <13.5 sec     Five Time Sit to Stand  18.4 60s: <11.4 sec  70s: <12.6 sec  80s: 14.8 sec     6 minutes walk  x 60s: >538f, 572m  70s: >471f, 527m  80s: >417f, 392m           Balance: Balance:     RIGHT  9/30/2021 LEFT  9/30/2021 Goal   Closed 45 --- 60 seconds      Tandem 15 10 20 seconds      Single Leg 3 2 20 seconds            FUNCTION:      CMS Impairment/Limitation/Restriction for FOTO Lower Extemity Survey     Therapist reviewed FOTO scores for Kal Todd on 9/30/2021.   FOTO documents entered into [employer] - see Media section.     Limitation Score: 47%            TREATMENT   Total Treatment time separate from Evaluation: (20) minutes     Kal received therapeutic exercises to develop strength, endurance, ROM, flexibility, and posture for (10) minutes including: x = exercises performed      TherEx 9/30/2021     NuStep   Level 3   Glute bridges        Tandem stance        Calf raises                                                                Patient education/ Posture Education          Plan for Next Visit: Hip stability and BLE fine motor          Home Exercises and Patient Education Provided     Education/Self-Care provided:  (included in the treatment) minutes   · Patient educated on the impairments noted above and the effects of physical therapy intervention to improve overall condition and QOL.   · Patient was educated on all the above exercise prior/during/after for proper  posture, positioning, and execution for safe performance with home exercise program.   · Exercise/Activity modifications:   ? 9/30/2021: EVAL: Low      Written Home Exercises Provided: yes. Prefers: Electronic Copies  Exercises were reviewed and Kal was able to demonstrate them prior to the end of the session.  Kal demonstrated good understanding of the education provided.   HEP to be provided during the first treatment session due to patient having to leave appt.      See EMR under Patient Instructions for exercises provided during therapy sessions.     ASSESSMENT   Kal is a 61 y.o. male referred to outpatient Physical Therapy with a medical diagnosis of general muscle weakness. Kal presents with clinical signs and symptoms that support this diagnosis with decreased knee and hip ROM, decreased hip girdle, quad, and hamstring Strength, general joint hypomobility, increased joint and soft tissue edema, poor standing balance and impaired functional mobility.  The above impairments will be addressed through manual therapy techniques, therapeutic exercises, functional training, and modalities as necessary. Patient was treated and educated on exercises for home program, progression of therapy, and benefits of therapy to achieve full functional mobility. Patient will benefit from skilled physical therapy to meet short and long term goals and return to prior level of function.     Pt prognosis is Good.   Pt will benefit from skilled outpatient Physical Therapy to address the deficits stated above and in the chart below, provide pt/family education, and to maximize pt's level of independence.      Plan of care discussed with patient: Yes  Pt's spiritual, cultural and educational needs considered and patient is agreeable to the plan of care and goals as stated below:      Anticipated Barriers for therapy: co-morbidities and sedentary lifestyle     Medical Necessity is demonstrated by the following:  "  History  Co-morbidities and personal factors that may impact the plan of care Co-morbidities:   diabetes, high BMI and HTN     Personal Factors:   lifestyle       moderate   Examination  Body Structures and Functions, activity limitations and participation restrictions that may impact the plan of care Body Regions:   lower extremities     Body Systems:    ROM  strength  balance  gait  motor control     Participation Restrictions:   See above in "Current Level of Function"      Activity limitations:   Learning and applying knowledge  no deficits     General Tasks and Commands  no deficits     Communication  no deficits     Mobility  walking     Self care  caring for body parts (brushing teeth, shaving, grooming)  dressing     Domestic Life  doing house work (cleaning house, washing dishes, laundry)     Interactions/Relationships  no deficits     Life Areas  no deficits     Community and Social Life  community life  recreation and leisure             low   Clinical Presentation stable and uncomplicated low   Decision Making/ Complexity Score: low         GOALS:  SHORT TERM GOALS: 4 weeks, (04/10/22) 9/30/2021   1. Recent signs and systems trend is improving in order to progress towards LTG's.     2. Patient will be independent with HEP in order to further progress and return to maximal function.     3. Pain rating at Worst: 5/10 in order to progress towards increased independence with activity.     4. Patient will be able to correct postural deviations in sitting and standing, to decrease pain and promote postural awareness for injury prevention.         LONG TERM GOALS: 8 weeks, (05/10/22) 9/30/2021   1. Patient will return to normal ADL, recreational, and work related activities with less pain and limitation.      2. Patient will improve AROM to stated goals in order to return to maximal functional potential.      3. Patient will improve Strength to stated goals of appropriate musculature in order to improve " functional independence.      4. Pain Rating at Best: 1/10 to improve Quality of Life.      5. Patient will meet predicted functional outcome (FOTO) score: 40% to increase self-worth & perceived functional ability.     6. Patient will have met/partially met personal goal of: be independent with ADLs and reduce assistance required to perform ADLs.               PLAN   Plan of care Certification: 06/10/22     Outpatient Physical Therapy 3 times weekly for 12 weeks to include any combination of the following interventions: virtual visits, dry needling, modalities, electrical stimulation (IFC, Pre-Mod, Attended with Functional Dry Needling), Manual Therapy, Moist Heat/ Ice, Neuromuscular Re-ed, Patient Education, Self Care, Therapeutic Activites, Therapeutic Exercise and Functional Training      Thank you for this referral.     These services are reasonable and necessary for the conditions set forth above while under my care.     Tj Figueroa, PT, DPT

## 2022-03-19 ENCOUNTER — DOCUMENTATION ONLY (OUTPATIENT)
Dept: REHABILITATION | Facility: HOSPITAL | Age: 62
End: 2022-03-19
Payer: MEDICARE

## 2022-03-21 ENCOUNTER — CLINICAL SUPPORT (OUTPATIENT)
Dept: REHABILITATION | Facility: HOSPITAL | Age: 62
End: 2022-03-21
Payer: MEDICARE

## 2022-03-21 DIAGNOSIS — R68.89 DECREASED STRENGTH, ENDURANCE, AND MOBILITY: Primary | ICD-10-CM

## 2022-03-21 DIAGNOSIS — M25.60 DECREASED RANGE OF MOTION: ICD-10-CM

## 2022-03-21 DIAGNOSIS — R53.1 DECREASED STRENGTH, ENDURANCE, AND MOBILITY: Primary | ICD-10-CM

## 2022-03-21 DIAGNOSIS — Z74.09 DECREASED STRENGTH, ENDURANCE, AND MOBILITY: Primary | ICD-10-CM

## 2022-03-21 PROCEDURE — 97112 NEUROMUSCULAR REEDUCATION: CPT

## 2022-03-21 PROCEDURE — 97110 THERAPEUTIC EXERCISES: CPT

## 2022-03-21 NOTE — PROGRESS NOTES
"  [facility]  Physical Therapy Progress Note      Name: Kal Todd  Clinic Number: [id number]    Therapy Diagnosis:   Encounter Diagnoses   Name Primary?    Decreased strength, endurance, and mobility Yes    Decreased range of motion      Physician: Bianka Gonzales NP    Visit Date: 3/21/2022  Physician Orders: PT Eval and Treat  Medical Diagnosis from Referral: General muscle weakness  Evaluation Date: 9/30/2021  Authorization Period Expiration: 12/06/21  Plan of Care Expiration: 05/10/22                          Progress Update: 04/10/22                      FOTO: 1 / 3   Visit # / Visits authorized: 1 / 20 (+1 eval)                          PRECAUTIONS: Standard Precautions and Safety/fall precautions      MD Visit: No follow up scheduled      Time In:1415  Time Out: 1500  Total Appointment Time (timed & untimed codes): 40 minutes    SUBJECTIVE     Pt reports: The patient has no complaints prior to therapy session.   Compliance with Hep: Every Other Day  Response to previous treatment: no adverse reactions to treatment/updated HEP  Functional change: Better    Pain: 0/10   Worst: 4/10  Location: Low back region   Pain Control: Medication and rest   Aggravating factors: unknown      OBJECTIVE     Objective Measures updated at progress report unless specified otherwise.      Treatment     Gym/Equipment Access: Yes; theraband   Time to Complete Exercises: increase secondary to verbal/tactile cues required     Kal received therapeutic exercises to develop strength, endurance, ROM, flexibility, and posture for (30) minutes including: x = exercises performed      TherEx Today     NuStep  x 6 mins Level 6   Calf stretches  x   3x30" (B)   Calf raises  x  2x10   DL shuttle     2x20    Glute bridges     3x10    SLR    3x10    clamshells     3x10 green band     Sidelying AROM hip IR    2x10   Box Squat   x 3x10 w/ 15lb KB 20 in box   Lateral walks /Monster walks  x 4x10 yds red " "theraband each   SL leg press  3x10 (B) pin 6   Heel taps  x 3x10 6in step    Step ups x 3x10 (B) 12 in                    Patient education/ Posture Education x           Kal participated in neuromuscular re-education activities to improve: Balance, Coordination, Proprioception and Posture for 10 minutes. The following activities were included:  Technique  Today Comment    Tandem Stance + KB pass x 3x30" w/ 7.5lb KB front foot on foam mat   Ankle rocks   2 min on foam mat   1/2 kneeling hip stretch  2 min per hip    Steps onto bosu balls      Forward lunge onto foam mat  x 3x10 (B)                   Plan for Next Visit: Hip stability and BLE fine motor          Home Exercises and Patient Education Provided     Education/Self-Care provided:  (included in the treatment) minutes   · Patient educated on the impairments noted above and the effects of physical therapy intervention to improve overall condition and QOL.   · Patient was educated on all the above exercise prior/during/after for proper posture, positioning, and execution for safe performance with home exercise program.   · Exercise/Activity modifications:   ? 9/30/2021: EVAL: Low      Written Home Exercises Provided: yes. Prefers: Electronic Copies  Exercises were reviewed and Kal was able to demonstrate them prior to the end of the session.  Kal demonstrated good understanding of the education provided.   1. Sitting calf raises   2. Sitting hip abduction     See EMR under Patient Instructions for exercises provided during therapy sessions.    ASSESSMENT     Kal Todd tolerated PT session well with minimal complaints discomfort secondary to muscle tightness and poor motor control. Objective findings are improving with functional mobility.  Therapy exercises were reviewed by revisiting exercises given from previous home exercise program while adding no new exercises.  Handouts were not issued during today's visit. Kal demonstrated good " understanding of new exercises and will continue to progress at home until next follow-up.       Kal is progressing well towards his goals.   Pt prognosis is Good.     Pt will continue to benefit from skilled outpatient physical therapy to address the deficits listed in the problem list box on initial evaluation, provide pt/family education and to maximize pt's level of independence in the home and community environment.     Pt's spiritual, cultural and educational needs considered and pt agreeable to plan of care and goals.    Anticipated barriers to physical therapy: Transportation     GOALS:  SHORT TERM GOALS: 4 weeks Today   1. Recent signs and systems trend is improving in order to progress towards LTG's.  PC   2. Patient will be independent with HEP in order to further progress and return to maximal function. PC   3. Pain rating at Worst: 5/10 in order to progress towards increased independence with activity.  PC   4. Patient will be able to correct postural deviations in sitting and standing, to decrease pain and promote postural awareness for injury prevention.   PC      LONG TERM GOALS: 8 weeks,     1. Patient will return to normal ADL, recreational, and work related activities with less pain and limitation.  PC   2. Patient will improve AROM to stated goals in order to return to maximal functional potential.  PC    3. Patient will improve Strength to stated goals of appropriate musculature in order to improve functional independence.  PC    4. Pain Rating at Best: 1/10 to improve Quality of Life.   PC   5. Patient will meet predicted functional outcome (FOTO) score: 40% to increase self-worth & perceived functional ability.  PC   6. Patient will have met/partially met personal goal of: be independent with ADLs and reduce assistance required to perform ADLs.   PC         PC = progressing/continue  PM= partially met  DC= discontinue    PLAN     Continue Plan of Care (POC) and progress per patient  tolerance. See Treatment section for exercise progression.    (Extended POC until 12/31/21)     Tj Figueroa, PT, DPT

## 2022-03-29 ENCOUNTER — CLINICAL SUPPORT (OUTPATIENT)
Dept: REHABILITATION | Facility: HOSPITAL | Age: 62
End: 2022-03-29
Payer: MEDICARE

## 2022-03-29 DIAGNOSIS — Z74.09 DECREASED STRENGTH, ENDURANCE, AND MOBILITY: Primary | ICD-10-CM

## 2022-03-29 DIAGNOSIS — R68.89 DECREASED STRENGTH, ENDURANCE, AND MOBILITY: Primary | ICD-10-CM

## 2022-03-29 DIAGNOSIS — R53.1 DECREASED STRENGTH, ENDURANCE, AND MOBILITY: Primary | ICD-10-CM

## 2022-03-29 DIAGNOSIS — M25.60 DECREASED RANGE OF MOTION: ICD-10-CM

## 2022-03-29 PROCEDURE — 97110 THERAPEUTIC EXERCISES: CPT

## 2022-03-29 PROCEDURE — 97112 NEUROMUSCULAR REEDUCATION: CPT

## 2022-03-30 NOTE — PROGRESS NOTES
"  [facility]  Physical Therapy Progress Note      Name: Kal Todd  Clinic Number: [id number]    Therapy Diagnosis:   Encounter Diagnoses   Name Primary?    Decreased strength, endurance, and mobility Yes    Decreased range of motion      Physician: Bianka Gonzales NP    Visit Date: 3/29/2022  Physician Orders: PT Eval and Treat  Medical Diagnosis from Referral: General muscle weakness  Evaluation Date: 9/30/2021  Authorization Period Expiration: 12/31/22  Plan of Care Expiration: 05/10/22                          Progress Update: 04/10/22                      FOTO: 1 / 3   Visit # / Visits authorized: 2 / 20 (+1 eval)                          PRECAUTIONS: Standard Precautions and Safety/fall precautions      MD Visit: No follow up scheduled      Time In:1415  Time Out: 1500  Total Appointment Time (timed & untimed codes): 40 minutes    SUBJECTIVE     Pt reports: The patient has no complaints prior to therapy session.   Compliance with Hep: Every Other Day  Response to previous treatment: no adverse reactions to treatment/updated HEP  Functional change: Better    Pain: 0/10   Worst: 4/10  Location: Low back region   Pain Control: Medication and rest   Aggravating factors: unknown      OBJECTIVE     Objective Measures updated at progress report unless specified otherwise.    Treatment     Gym/Equipment Access: Yes; theraband   Time to Complete Exercises: increase secondary to verbal/tactile cues required     Kal received therapeutic exercises to develop strength, endurance, ROM, flexibility, and posture for (30) minutes including: x = exercises performed      TherEx Today     NuStep  x 6 mins Level 6   Calf stretches  x   3x30" (B)   Calf raises  x  2x10   DL shuttle     2x20    Glute bridges     3x10    SLR    3x10    clamshells     3x10 green band     Sidelying AROM hip IR    2x10   Box Squat   x 3x10 w/ 15lb KB 20 in box   Lateral walks /Monster walks  x 4x10 yds red " "theraband each   SL leg press x 3x10 (B) pin 6   Heel taps  x 3x10 6in step    Step ups x 3x10 (B) 12 in                    Patient education/ Posture Education x           Kal participated in neuromuscular re-education activities to improve: Balance, Coordination, Proprioception and Posture for 10 minutes. The following activities were included:  Technique  Today Comment    Tandem Stance + KB pass x 3x30" w/ 7.5lb KB front foot on foam mat   Ankle rocks   2 min on foam mat   1/2 kneeling hip stretch  2 min per hip    Steps onto bosu balls      Forward lunge onto foam mat  x 3x10 (B)               Plan for Next Visit: Hip stability and BLE fine motor       Home Exercises and Patient Education Provided     Education/Self-Care provided:  (included in the treatment) minutes   · Patient educated on the impairments noted above and the effects of physical therapy intervention to improve overall condition and QOL.   · Patient was educated on all the above exercise prior/during/after for proper posture, positioning, and execution for safe performance with home exercise program.   · Exercise/Activity modifications:   ? 9/30/2021: EVAL: Low      Written Home Exercises Provided: yes. Prefers: Electronic Copies  Exercises were reviewed and Kal was able to demonstrate them prior to the end of the session.  Kal demonstrated good understanding of the education provided.   1. Sitting calf raises   2. Sitting hip abduction     See EMR under Patient Instructions for exercises provided during therapy sessions.    ASSESSMENT     Kal Todd tolerated PT session well with minimal complaints discomfort secondary to muscle tightness and poor motor control. Objective findings are improving with functional mobility.  Therapy exercises were reviewed by revisiting exercises given from previous home exercise program while adding no new exercises.  Handouts were not issued during today's visit. Kal demonstrated good understanding " of new exercises and will continue to progress at home until next follow-up.       Kal is progressing well towards his goals.   Pt prognosis is Good.     Pt will continue to benefit from skilled outpatient physical therapy to address the deficits listed in the problem list box on initial evaluation, provide pt/family education and to maximize pt's level of independence in the home and community environment.     Pt's spiritual, cultural and educational needs considered and pt agreeable to plan of care and goals.    Anticipated barriers to physical therapy: Transportation     GOALS:  SHORT TERM GOALS: 4 weeks Today   1. Recent signs and systems trend is improving in order to progress towards LTG's.  PC   2. Patient will be independent with HEP in order to further progress and return to maximal function. PC   3. Pain rating at Worst: 5/10 in order to progress towards increased independence with activity.  PC   4. Patient will be able to correct postural deviations in sitting and standing, to decrease pain and promote postural awareness for injury prevention.   PC      LONG TERM GOALS: 8 weeks,     1. Patient will return to normal ADL, recreational, and work related activities with less pain and limitation.  PC   2. Patient will improve AROM to stated goals in order to return to maximal functional potential.  PC    3. Patient will improve Strength to stated goals of appropriate musculature in order to improve functional independence.  PC    4. Pain Rating at Best: 1/10 to improve Quality of Life.   PC   5. Patient will meet predicted functional outcome (FOTO) score: 40% to increase self-worth & perceived functional ability.  PC   6. Patient will have met/partially met personal goal of: be independent with ADLs and reduce assistance required to perform ADLs.   PC         PC = progressing/continue  PM= partially met  DC= discontinue    PLAN     Continue Plan of Care (POC) and progress per patient tolerance. See  Treatment section for exercise progression.    Tj Figueroa, PT, DPT

## 2022-04-05 ENCOUNTER — CLINICAL SUPPORT (OUTPATIENT)
Dept: REHABILITATION | Facility: HOSPITAL | Age: 62
End: 2022-04-05
Payer: MEDICARE

## 2022-04-05 DIAGNOSIS — M25.60 DECREASED RANGE OF MOTION: ICD-10-CM

## 2022-04-05 DIAGNOSIS — R68.89 DECREASED STRENGTH, ENDURANCE, AND MOBILITY: Primary | ICD-10-CM

## 2022-04-05 DIAGNOSIS — R53.1 DECREASED STRENGTH, ENDURANCE, AND MOBILITY: Primary | ICD-10-CM

## 2022-04-05 DIAGNOSIS — Z74.09 DECREASED STRENGTH, ENDURANCE, AND MOBILITY: Primary | ICD-10-CM

## 2022-04-05 PROCEDURE — 97110 THERAPEUTIC EXERCISES: CPT

## 2022-04-05 PROCEDURE — 97112 NEUROMUSCULAR REEDUCATION: CPT

## 2022-04-05 NOTE — PROGRESS NOTES
"  [facility]  Physical Therapy treatment note      Name: Kal Todd  Clinic Number: [id number]    Therapy Diagnosis:   Encounter Diagnoses   Name Primary?    Decreased strength, endurance, and mobility Yes    Decreased range of motion      Physician: Bianka Gonzales NP    Visit Date: 4/5/2022  Physician Orders: PT Eval and Treat  Medical Diagnosis from Referral: General muscle weakness  Evaluation Date: 9/30/2021  Authorization Period Expiration: 12/31/22  Plan of Care Expiration: 05/10/22                          Progress Update: 04/10/22                      FOTO: 1 / 3   Visit # / Visits authorized: 3 / 20 (+1 eval)                          PRECAUTIONS: Standard Precautions and Safety/fall precautions      MD Visit: No follow up scheduled      Time In:0900  Time Out: 0945  Total Appointment Time (timed & untimed codes): 40 minutes    SUBJECTIVE     Pt reports: The patient has no complaints prior to therapy session.   Compliance with Hep: Every Other Day  Response to previous treatment: no adverse reactions to treatment/updated HEP  Functional change: Better    Pain: 0/10   Worst: 4/10  Location: Low back region   Pain Control: Medication and rest   Aggravating factors: unknown      OBJECTIVE     Objective Measures updated at progress report unless specified otherwise.    Treatment     Gym/Equipment Access: Yes; theraband   Time to Complete Exercises: increase secondary to verbal/tactile cues required     Kal received therapeutic exercises to develop strength, endurance, ROM, flexibility, and posture for (30) minutes including: x = exercises performed      TherEx Today     NuStep  x 6 mins Level 6   Calf stretches  x   3x30" (B)   Calf raises  x  2x10   DL shuttle     2x20    Glute bridges     3x10    SLR    3x10    clamshells     3x10 green band     Sidelying AROM hip IR    2x10   Box Squat   x 3x10 w/ 15lb KB 20 in box   Lateral walks /Monster walks  x 4x10 yds red " "theraband each   SL leg press x 3x10 (B) pin 6   Heel taps  x 3x10 6in step    Step ups x 3x10 (B) 12 in     Pallof press   x   2x10 (B)           Patient education/ Posture Education x           Kal participated in neuromuscular re-education activities to improve: Balance, Coordination, Proprioception and Posture for 10 minutes. The following activities were included:  Technique  Today Comment    Tandem Stance + KB pass x 3x30" w/ 7.5lb KB front foot on foam mat   Ankle rocks   2 min on foam mat   1/2 kneeling hip stretch  2 min per hip    Steps onto bosu balls      Forward lunge onto foam mat  x 3x10 (B)               Plan for Next Visit: Hip stability and BLE fine motor       Home Exercises and Patient Education Provided     Education/Self-Care provided:  (included in the treatment) minutes   · Patient educated on the impairments noted above and the effects of physical therapy intervention to improve overall condition and QOL.   · Patient was educated on all the above exercise prior/during/after for proper posture, positioning, and execution for safe performance with home exercise program.   · Exercise/Activity modifications:   ? 9/30/2021: EVAL: Low      Written Home Exercises Provided: yes. Prefers: Electronic Copies  Exercises were reviewed and Kal was able to demonstrate them prior to the end of the session.  Kal demonstrated good understanding of the education provided.   1. Sitting calf raises   2. Sitting hip abduction     See EMR under Patient Instructions for exercises provided during therapy sessions.    ASSESSMENT     Kal Todd tolerated PT session well with minimal complaints discomfort secondary to muscle tightness and poor motor control. Objective findings are improving with functional mobility.  Therapy exercises were reviewed by revisiting exercises given from previous home exercise program while adding no new exercises.  Handouts were not issued during today's visit. Kal" demonstrated good understanding of new exercises and will continue to progress at home until next follow-up.       Kal is progressing well towards his goals.   Pt prognosis is Good.     Pt will continue to benefit from skilled outpatient physical therapy to address the deficits listed in the problem list box on initial evaluation, provide pt/family education and to maximize pt's level of independence in the home and community environment.     Pt's spiritual, cultural and educational needs considered and pt agreeable to plan of care and goals.    Anticipated barriers to physical therapy: Transportation     GOALS:  SHORT TERM GOALS: 4 weeks Today   1. Recent signs and systems trend is improving in order to progress towards LTG's.  PC   2. Patient will be independent with HEP in order to further progress and return to maximal function. PC   3. Pain rating at Worst: 5/10 in order to progress towards increased independence with activity.  PC   4. Patient will be able to correct postural deviations in sitting and standing, to decrease pain and promote postural awareness for injury prevention.   PC      LONG TERM GOALS: 8 weeks,     1. Patient will return to normal ADL, recreational, and work related activities with less pain and limitation.  PC   2. Patient will improve AROM to stated goals in order to return to maximal functional potential.  PC    3. Patient will improve Strength to stated goals of appropriate musculature in order to improve functional independence.  PC    4. Pain Rating at Best: 1/10 to improve Quality of Life.   PC   5. Patient will meet predicted functional outcome (FOTO) score: 40% to increase self-worth & perceived functional ability.  PC   6. Patient will have met/partially met personal goal of: be independent with ADLs and reduce assistance required to perform ADLs.   PC         PC = progressing/continue  PM= partially met  DC= discontinue    PLAN     Continue Plan of Care (POC) and progress  per patient tolerance. See Treatment section for exercise progression.    Tj Figueroa, PT, DPT

## 2022-04-12 ENCOUNTER — CLINICAL SUPPORT (OUTPATIENT)
Dept: REHABILITATION | Facility: HOSPITAL | Age: 62
End: 2022-04-12
Payer: MEDICARE

## 2022-04-12 DIAGNOSIS — R53.1 DECREASED STRENGTH, ENDURANCE, AND MOBILITY: Primary | ICD-10-CM

## 2022-04-12 DIAGNOSIS — Z74.09 DECREASED STRENGTH, ENDURANCE, AND MOBILITY: Primary | ICD-10-CM

## 2022-04-12 DIAGNOSIS — M25.60 DECREASED RANGE OF MOTION: ICD-10-CM

## 2022-04-12 DIAGNOSIS — R68.89 DECREASED STRENGTH, ENDURANCE, AND MOBILITY: Primary | ICD-10-CM

## 2022-04-12 PROCEDURE — 97110 THERAPEUTIC EXERCISES: CPT

## 2022-04-12 NOTE — PROGRESS NOTES
"  [facility]  Physical Therapy treatment note      Name: Kal Todd  Clinic Number: [id number]    Therapy Diagnosis:   Encounter Diagnoses   Name Primary?    Decreased strength, endurance, and mobility Yes    Decreased range of motion      Physician: Bianka Gonzales NP    Visit Date: 4/12/2022  Physician Orders: PT Eval and Treat  Medical Diagnosis from Referral: General muscle weakness  Evaluation Date: 9/30/2021  Authorization Period Expiration: 12/31/22  Plan of Care Expiration: 05/10/22                          Progress Update: 04/10/22                      FOTO: 1 / 3   Visit # / Visits authorized: 4 / 20 (+1 eval)                          PRECAUTIONS: Standard Precautions and Safety/fall precautions      MD Visit: No follow up scheduled      Time In:0945  Time Out: 1030  Total Appointment Time (timed & untimed codes): 40 minutes    SUBJECTIVE     Pt reports: The patient has increase back pain with movement. Pain appeared 48 hrs ago.   Compliance with Hep: Every Other Day  Response to previous treatment: no adverse reactions to treatment/updated HEP  Functional change: Better    Pain: 6/10   Worst: 8/10  Location: Low back region   Pain Control: Medication and rest   Aggravating factors: unknown      OBJECTIVE     Objective Measures updated at progress report unless specified otherwise.    Treatment     Gym/Equipment Access: Yes; theraband   Time to Complete Exercises: increase secondary to verbal/tactile cues required     Kal received therapeutic exercises to develop strength, endurance, ROM, flexibility, and posture for (40) minutes including: x = exercises performed      TherEx Today     NuStep  x 6 mins Level 6   Calf stretches  x   3x30" (B)   Calf raises    2x10   DL shuttle     2x20    Glute bridges   x  3x10    SLR  x  3x10    clamshells   x  3x10 green band     Sidelying AROM hip IR  x  2x10   Box Squat    3x10 w/ 15lb KB 20 in box   Lateral walks /Monster walks   " "4x10 yds red theraband each   SL leg press  3x10 (B) pin 6   Heel taps   3x10 6in step    Step ups  3x10 (B) 12 in     Pallof press   x   2x10 (B)           Patient education/ Posture Education x           Kal participated in neuromuscular re-education activities to improve: Balance, Coordination, Proprioception and Posture for 0 minutes. The following activities were included:  Technique  Today Comment    Tandem Stance + KB pass  3x30" w/ 7.5lb KB front foot on foam mat   Ankle rocks   2 min on foam mat   1/2 kneeling hip stretch  2 min per hip    Steps onto bosu balls      Forward lunge onto foam mat   3x10 (B)               Plan for Next Visit: Hip stability and BLE fine motor       Home Exercises and Patient Education Provided     Education/Self-Care provided:  (included in the treatment) minutes   · Patient educated on the impairments noted above and the effects of physical therapy intervention to improve overall condition and QOL.   · Patient was educated on all the above exercise prior/during/after for proper posture, positioning, and execution for safe performance with home exercise program.   · Exercise/Activity modifications:   ? 9/30/2021: EVAL: Low      Written Home Exercises Provided: yes. Prefers: Electronic Copies  Exercises were reviewed and Kal was able to demonstrate them prior to the end of the session.  Kal demonstrated good understanding of the education provided.   1. Sitting calf raises   2. Sitting hip abduction     See EMR under Patient Instructions for exercises provided during therapy sessions.    ASSESSMENT     Kal Todd tolerated PT session well with minimal complaints discomfort secondary to muscle tightness and poor motor control. Objective findings are improving with functional mobility.  Therapy exercises were reviewed by revisiting exercises given from previous home exercise program while adding no new exercises.  Handouts were not issued during today's visit. Kal" demonstrated good understanding of new exercises and will continue to progress at home until next follow-up.       Kal is progressing well towards his goals.   Pt prognosis is Good.     Pt will continue to benefit from skilled outpatient physical therapy to address the deficits listed in the problem list box on initial evaluation, provide pt/family education and to maximize pt's level of independence in the home and community environment.     Pt's spiritual, cultural and educational needs considered and pt agreeable to plan of care and goals.    Anticipated barriers to physical therapy: Transportation     GOALS:  SHORT TERM GOALS: 4 weeks Today   1. Recent signs and systems trend is improving in order to progress towards LTG's.  PC   2. Patient will be independent with HEP in order to further progress and return to maximal function. PC   3. Pain rating at Worst: 5/10 in order to progress towards increased independence with activity.  PC   4. Patient will be able to correct postural deviations in sitting and standing, to decrease pain and promote postural awareness for injury prevention.   PC      LONG TERM GOALS: 8 weeks,     1. Patient will return to normal ADL, recreational, and work related activities with less pain and limitation.  PC   2. Patient will improve AROM to stated goals in order to return to maximal functional potential.  PC    3. Patient will improve Strength to stated goals of appropriate musculature in order to improve functional independence.  PC    4. Pain Rating at Best: 1/10 to improve Quality of Life.   PC   5. Patient will meet predicted functional outcome (FOTO) score: 40% to increase self-worth & perceived functional ability.  PC   6. Patient will have met/partially met personal goal of: be independent with ADLs and reduce assistance required to perform ADLs.   PC         PC = progressing/continue  PM= partially met  DC= discontinue    PLAN     Continue Plan of Care (POC) and progress  per patient tolerance. See Treatment section for exercise progression.    Tj Figueroa, PT, DPT

## 2022-04-14 ENCOUNTER — CLINICAL SUPPORT (OUTPATIENT)
Dept: REHABILITATION | Facility: HOSPITAL | Age: 62
End: 2022-04-14
Payer: MEDICARE

## 2022-04-14 DIAGNOSIS — R53.1 DECREASED STRENGTH, ENDURANCE, AND MOBILITY: Primary | ICD-10-CM

## 2022-04-14 DIAGNOSIS — Z74.09 DECREASED STRENGTH, ENDURANCE, AND MOBILITY: Primary | ICD-10-CM

## 2022-04-14 DIAGNOSIS — M25.60 DECREASED RANGE OF MOTION: ICD-10-CM

## 2022-04-14 DIAGNOSIS — R68.89 DECREASED STRENGTH, ENDURANCE, AND MOBILITY: Primary | ICD-10-CM

## 2022-04-14 PROCEDURE — 97110 THERAPEUTIC EXERCISES: CPT

## 2022-04-14 NOTE — PROGRESS NOTES
"  [facility]  Physical Therapy treatment note      Name: Kal Todd  Clinic Number: [id number]    Therapy Diagnosis:   Encounter Diagnoses   Name Primary?    Decreased strength, endurance, and mobility Yes    Decreased range of motion      Physician: Bianka Gonzales NP    Visit Date: 4/14/2022  Physician Orders: PT Eval and Treat  Medical Diagnosis from Referral: General muscle weakness  Evaluation Date: 9/30/2021  Authorization Period Expiration: 12/31/22  Plan of Care Expiration: 05/10/22                          Progress Update: 04/10/22                      FOTO: 1 / 3   Visit # / Visits authorized: 5/ 20 (+1 eval)                          PRECAUTIONS: Standard Precautions and Safety/fall precautions      MD Visit: No follow up scheduled      Time In:1415  Time Out: 1500  Total Appointment Time (timed & untimed codes): 40 minutes    SUBJECTIVE     Pt reports: decrease back pain after taking medication on Tuesday.   Compliance with Hep: Every Other Day  Response to previous treatment: no adverse reactions to treatment/updated HEP  Functional change: Better    Pain: 3/10   Worst: 8/10  Location: Low back region   Pain Control: Medication and rest   Aggravating factors: unknown      OBJECTIVE     Objective Measures updated at progress report unless specified otherwise.    Treatment     Gym/Equipment Access: Yes; theraband   Time to Complete Exercises: increase secondary to verbal/tactile cues required     Kal received therapeutic exercises to develop strength, endurance, ROM, flexibility, and posture for (40) minutes including: x = exercises performed      TherEx Today     NuStep  x 6 mins Level 6   Calf stretches  x   3x30" (B)   Calf raises  x  2x10   DL shuttle     2x20    Glute bridges     3x10    SLR   3x10    clamshells     3x10 green band     Sidelying AROM hip IR    2x10   Box Squat  x  3x10 w/ 15lb KB 20 in box   Lateral walks /Monster walks   4x10 yds red theraband " "each   SL leg press x 3x10 (B) pin 6   Heel taps   3x10 6in step    Step ups  3x10 (B) 12 in     Pallof press   x   2x10 (B)    Farm Carries  X    6x30 yds + 25lbs    Patient education/ Posture Education x           Kal participated in neuromuscular re-education activities to improve: Balance, Coordination, Proprioception and Posture for 0 minutes. The following activities were included:  Technique  Today Comment    Tandem Stance + KB pass  3x30" w/ 7.5lb KB front foot on foam mat   Ankle rocks   2 min on foam mat   1/2 kneeling hip stretch  2 min per hip    Steps onto bosu balls      Forward lunge onto foam mat   3x10 (B)               Plan for Next Visit: Hip stability and BLE fine motor       Home Exercises and Patient Education Provided     Education/Self-Care provided:  (included in the treatment) minutes   · Patient educated on the impairments noted above and the effects of physical therapy intervention to improve overall condition and QOL.   · Patient was educated on all the above exercise prior/during/after for proper posture, positioning, and execution for safe performance with home exercise program.   · Exercise/Activity modifications:   ? 9/30/2021: EVAL: Low      Written Home Exercises Provided: yes. Prefers: Electronic Copies  Exercises were reviewed and Kal was able to demonstrate them prior to the end of the session.  Kal demonstrated good understanding of the education provided.   1. Sitting calf raises   2. Sitting hip abduction     See EMR under Patient Instructions for exercises provided during therapy sessions.    ASSESSMENT     Kal Todd tolerated PT session well with minimal complaints discomfort secondary to muscle tightness and poor motor control. Objective findings are improving with functional mobility.  Therapy exercises were reviewed by revisiting exercises given from previous home exercise program while adding no new exercises.  Handouts were not issued during today's " visit. Kal demonstrated good understanding of new exercises and will continue to progress at home until next follow-up.       Kal is progressing well towards his goals.   Pt prognosis is Good.     Pt will continue to benefit from skilled outpatient physical therapy to address the deficits listed in the problem list box on initial evaluation, provide pt/family education and to maximize pt's level of independence in the home and community environment.     Pt's spiritual, cultural and educational needs considered and pt agreeable to plan of care and goals.    Anticipated barriers to physical therapy: Transportation     GOALS:  SHORT TERM GOALS: 4 weeks Today   1. Recent signs and systems trend is improving in order to progress towards LTG's.  PC   2. Patient will be independent with HEP in order to further progress and return to maximal function. PC   3. Pain rating at Worst: 5/10 in order to progress towards increased independence with activity.  PC   4. Patient will be able to correct postural deviations in sitting and standing, to decrease pain and promote postural awareness for injury prevention.   PC      LONG TERM GOALS: 8 weeks,     1. Patient will return to normal ADL, recreational, and work related activities with less pain and limitation.  PC   2. Patient will improve AROM to stated goals in order to return to maximal functional potential.  PC    3. Patient will improve Strength to stated goals of appropriate musculature in order to improve functional independence.  PC    4. Pain Rating at Best: 1/10 to improve Quality of Life.   PC   5. Patient will meet predicted functional outcome (FOTO) score: 40% to increase self-worth & perceived functional ability.  PC   6. Patient will have met/partially met personal goal of: be independent with ADLs and reduce assistance required to perform ADLs.   PC         PC = progressing/continue  PM= partially met  DC= discontinue    PLAN     Continue Plan of Care (POC)  and progress per patient tolerance. See Treatment section for exercise progression.    Tj Figueroa, PT, DPT

## 2022-04-20 ENCOUNTER — CLINICAL SUPPORT (OUTPATIENT)
Dept: REHABILITATION | Facility: HOSPITAL | Age: 62
End: 2022-04-20
Payer: MEDICARE

## 2022-04-20 DIAGNOSIS — R53.1 DECREASED STRENGTH, ENDURANCE, AND MOBILITY: Primary | ICD-10-CM

## 2022-04-20 DIAGNOSIS — M25.60 DECREASED RANGE OF MOTION: ICD-10-CM

## 2022-04-20 DIAGNOSIS — R68.89 DECREASED STRENGTH, ENDURANCE, AND MOBILITY: Primary | ICD-10-CM

## 2022-04-20 DIAGNOSIS — Z74.09 DECREASED STRENGTH, ENDURANCE, AND MOBILITY: Primary | ICD-10-CM

## 2022-04-20 PROCEDURE — 97110 THERAPEUTIC EXERCISES: CPT

## 2022-04-26 NOTE — PROGRESS NOTES
"  [facility]  Physical Therapy treatment note      Name: Kal Todd  Clinic Number: [id number]    Therapy Diagnosis:   Encounter Diagnoses   Name Primary?    Decreased strength, endurance, and mobility Yes    Decreased range of motion      Physician: Bianka Gonzales NP    Visit Date: 4/20/2022  Physician Orders: PT Eval and Treat  Medical Diagnosis from Referral: General muscle weakness  Evaluation Date: 9/30/2021  Authorization Period Expiration: 12/31/22  Plan of Care Expiration: 05/10/22                          Progress Update: 04/10/22                      FOTO: 1 / 3   Visit # / Visits authorized: 6/ 20 (+1 eval)                          PRECAUTIONS: Standard Precautions and Safety/fall precautions      MD Visit: No follow up scheduled      Time In:0815  Time Out: 0900  Total Appointment Time (timed & untimed codes): 40 minutes    SUBJECTIVE     Pt reports: decrease back pain after taking medication on Tuesday.   Compliance with Hep: Every Other Day  Response to previous treatment: no adverse reactions to treatment/updated HEP  Functional change: Better    Pain: 3/10   Worst: 8/10  Location: Low back region   Pain Control: Medication and rest   Aggravating factors: unknown    OBJECTIVE     Objective Measures updated at progress report unless specified otherwise.    Treatment     Gym/Equipment Access: Yes; theraband   Time to Complete Exercises: increase secondary to verbal/tactile cues required     Kal received therapeutic exercises to develop strength, endurance, ROM, flexibility, and posture for (40) minutes including: x = exercises performed      TherEx Today     NuStep  x 6 mins Level 6   Calf stretches  x   3x30" (B)   Calf raises  x  2x10   DL shuttle     2x20    Glute bridges     3x10    SLR   3x10    clamshells     3x10 green band     Sidelying AROM hip IR    2x10   Box Squat  x  3x10 w/ 15lb KB 20 in box   Lateral walks /Monster walks  x 4x10 yds red theraband " "each   SL leg press x 3x10 (B) pin 6   Heel taps   3x10 6in step    Step ups x 3x10 (B) 12 in     Pallof press   x   2x10 (B)    Farm Carries      6x30 yds + 25lbs    Patient education/ Posture Education x          Kal participated in neuromuscular re-education activities to improve: Balance, Coordination, Proprioception and Posture for 0 minutes. The following activities were included:  Technique  Today Comment    Tandem Stance + KB pass  3x30" w/ 7.5lb KB front foot on foam mat   Ankle rocks   2 min on foam mat   1/2 kneeling hip stretch  2 min per hip    Steps onto bosu balls      Forward lunge onto foam mat   3x10 (B)               Plan for Next Visit: Hip stability and BLE fine motor       Home Exercises and Patient Education Provided     Education/Self-Care provided:  (included in the treatment) minutes   · Patient educated on the impairments noted above and the effects of physical therapy intervention to improve overall condition and QOL.   · Patient was educated on all the above exercise prior/during/after for proper posture, positioning, and execution for safe performance with home exercise program.   · Exercise/Activity modifications:   ? 9/30/2021: EVAL: Low      Written Home Exercises Provided: yes. Prefers: Electronic Copies  Exercises were reviewed and Kal was able to demonstrate them prior to the end of the session.  Kal demonstrated good understanding of the education provided.   1. Sitting calf raises   2. Sitting hip abduction     See EMR under Patient Instructions for exercises provided during therapy sessions.    ASSESSMENT     Kal Todd tolerated PT session well with minimal complaints discomfort secondary to muscle tightness and poor motor control. Objective findings are improving with functional mobility.  Therapy exercises were reviewed by revisiting exercises given from previous home exercise program while adding no new exercises.  Handouts were not issued during today's visit. " Kal demonstrated good understanding of new exercises and will continue to progress at home until next follow-up.       Kal is progressing well towards his goals.   Pt prognosis is Good.     Pt will continue to benefit from skilled outpatient physical therapy to address the deficits listed in the problem list box on initial evaluation, provide pt/family education and to maximize pt's level of independence in the home and community environment.     Pt's spiritual, cultural and educational needs considered and pt agreeable to plan of care and goals.    Anticipated barriers to physical therapy: Transportation     GOALS:  SHORT TERM GOALS: 4 weeks Today   1. Recent signs and systems trend is improving in order to progress towards LTG's.  PC   2. Patient will be independent with HEP in order to further progress and return to maximal function. PC   3. Pain rating at Worst: 5/10 in order to progress towards increased independence with activity.  PC   4. Patient will be able to correct postural deviations in sitting and standing, to decrease pain and promote postural awareness for injury prevention.   PC      LONG TERM GOALS: 8 weeks,     1. Patient will return to normal ADL, recreational, and work related activities with less pain and limitation.  PC   2. Patient will improve AROM to stated goals in order to return to maximal functional potential.  PC    3. Patient will improve Strength to stated goals of appropriate musculature in order to improve functional independence.  PC    4. Pain Rating at Best: 1/10 to improve Quality of Life.   PC   5. Patient will meet predicted functional outcome (FOTO) score: 40% to increase self-worth & perceived functional ability.  PC   6. Patient will have met/partially met personal goal of: be independent with ADLs and reduce assistance required to perform ADLs.   PC         PC = progressing/continue  PM= partially met  DC= discontinue    PLAN     Continue Plan of Care (POC) and  progress per patient tolerance. See Treatment section for exercise progression.    Tj Figueroa, PT, DPT

## 2022-05-02 ENCOUNTER — CLINICAL SUPPORT (OUTPATIENT)
Dept: REHABILITATION | Facility: HOSPITAL | Age: 62
End: 2022-05-02
Payer: MEDICARE

## 2022-05-02 DIAGNOSIS — M25.60 DECREASED RANGE OF MOTION: ICD-10-CM

## 2022-05-02 DIAGNOSIS — Z74.09 DECREASED STRENGTH, ENDURANCE, AND MOBILITY: Primary | ICD-10-CM

## 2022-05-02 DIAGNOSIS — R68.89 DECREASED STRENGTH, ENDURANCE, AND MOBILITY: Primary | ICD-10-CM

## 2022-05-02 DIAGNOSIS — R53.1 DECREASED STRENGTH, ENDURANCE, AND MOBILITY: Primary | ICD-10-CM

## 2022-05-02 PROCEDURE — 97110 THERAPEUTIC EXERCISES: CPT

## 2022-05-02 NOTE — PROGRESS NOTES
"  [facility]  Physical Therapy treatment note      Name: Kal Todd  Clinic Number: [id number]    Therapy Diagnosis:   Encounter Diagnoses   Name Primary?    Decreased strength, endurance, and mobility Yes    Decreased range of motion      Physician: Bianka Gonzales NP  Visit Date: 5/2/2022  Physician Orders: PT Eval and Treat  Medical Diagnosis from Referral: General muscle weakness  Evaluation Date: 9/30/2021  Authorization Period Expiration: 12/31/22  Plan of Care Expiration: 05/10/22                          Progress Update: 04/10/22                      FOTO: 1 / 3   Visit # / Visits authorized: 7/ 20 (+1 eval)                          PRECAUTIONS: Standard Precautions and Safety/fall precautions      MD Visit: No follow up scheduled      Time In:0700  Time Out: 0745  Total Appointment Time (timed & untimed codes): 40 minutes    SUBJECTIVE     Pt reports: decrease back pain after taking medication on Tuesday. When to urgent care to get shot to hel with back pain.    Compliance with Hep: Every Other Day  Response to previous treatment: no adverse reactions to treatment/updated HEP  Functional change: Better    Pain: 3/10   Worst: 8/10  Location: Low back region   Pain Control: Medication and rest   Aggravating factors: unknown    OBJECTIVE     Objective Measures updated at progress report unless specified otherwise.    Treatment     Gym/Equipment Access: Yes; theraband   Time to Complete Exercises: increase secondary to verbal/tactile cues required     Kal received therapeutic exercises to develop strength, endurance, ROM, flexibility, and posture for (40) minutes including: x = exercises performed      TherEx Today     NuStep  x 6 mins Level 6   Calf stretches    3x30" (B)   Calf raises    2x10   DL shuttle     2x20    Glute bridges   X  3x10    SLR X  3x10    clamshells   X  3x10 green band     Sidelying AROM hip IR  X  2x10   Box Squat  x  3x10 w/ 15lb KB 20 in box " "  Lateral walks /Monster walks  x 4x10 yds red theraband each   SL leg press x 3x10 (B) pin 6   Heel taps   3x10 6in step    Step ups  3x10 (B) 12 in     Pallof press   x   2x10 (B)    Farm Carries      6x30 yds + 25lbs    Patient education/ Posture Education x          Kal participated in neuromuscular re-education activities to improve: Balance, Coordination, Proprioception and Posture for 0 minutes. The following activities were included:  Technique  Today Comment    Tandem Stance + KB pass  3x30" w/ 7.5lb KB front foot on foam mat   Ankle rocks   2 min on foam mat   1/2 kneeling hip stretch  2 min per hip    Steps onto bosu balls      Forward lunge onto foam mat   3x10 (B)               Plan for Next Visit: Hip stability and BLE fine motor       Home Exercises and Patient Education Provided     Education/Self-Care provided:  (included in the treatment) minutes   · Patient educated on the impairments noted above and the effects of physical therapy intervention to improve overall condition and QOL.   · Patient was educated on all the above exercise prior/during/after for proper posture, positioning, and execution for safe performance with home exercise program.   · Exercise/Activity modifications:   ? 9/30/2021: EVAL: Low      Written Home Exercises Provided: yes. Prefers: Electronic Copies  Exercises were reviewed and Kal was able to demonstrate them prior to the end of the session.  Kal demonstrated good understanding of the education provided.   1. Sitting calf raises   2. Sitting hip abduction     See EMR under Patient Instructions for exercises provided during therapy sessions.    ASSESSMENT     Kal Todd tolerated PT session well with minimal complaints discomfort secondary to muscle tightness and poor motor control. Objective findings are improving with functional mobility.  Therapy exercises were reviewed by revisiting exercises given from previous home exercise program while adding no new " exercises.  Handouts were not issued during today's visit. Kal demonstrated good understanding of new exercises and will continue to progress at home until next follow-up.       Kal is progressing well towards his goals.   Pt prognosis is Good.     Pt will continue to benefit from skilled outpatient physical therapy to address the deficits listed in the problem list box on initial evaluation, provide pt/family education and to maximize pt's level of independence in the home and community environment.     Pt's spiritual, cultural and educational needs considered and pt agreeable to plan of care and goals.    Anticipated barriers to physical therapy: Transportation     GOALS:  SHORT TERM GOALS: 4 weeks Today   1. Recent signs and systems trend is improving in order to progress towards LTG's.  PC   2. Patient will be independent with HEP in order to further progress and return to maximal function. PC   3. Pain rating at Worst: 5/10 in order to progress towards increased independence with activity.  PC   4. Patient will be able to correct postural deviations in sitting and standing, to decrease pain and promote postural awareness for injury prevention.   PC      LONG TERM GOALS: 8 weeks,     1. Patient will return to normal ADL, recreational, and work related activities with less pain and limitation.  PC   2. Patient will improve AROM to stated goals in order to return to maximal functional potential.  PC    3. Patient will improve Strength to stated goals of appropriate musculature in order to improve functional independence.  PC    4. Pain Rating at Best: 1/10 to improve Quality of Life.   PC   5. Patient will meet predicted functional outcome (FOTO) score: 40% to increase self-worth & perceived functional ability.  PC   6. Patient will have met/partially met personal goal of: be independent with ADLs and reduce assistance required to perform ADLs.   PC         PC = progressing/continue  PM= partially met  DC=  discontinue    PLAN     Continue Plan of Care (POC) and progress per patient tolerance. See Treatment section for exercise progression.    Tj Figueroa, PT, DPT

## 2022-05-04 ENCOUNTER — CLINICAL SUPPORT (OUTPATIENT)
Dept: REHABILITATION | Facility: HOSPITAL | Age: 62
End: 2022-05-04
Payer: MEDICARE

## 2022-05-04 DIAGNOSIS — M25.60 DECREASED RANGE OF MOTION: ICD-10-CM

## 2022-05-04 DIAGNOSIS — R53.1 DECREASED STRENGTH, ENDURANCE, AND MOBILITY: Primary | ICD-10-CM

## 2022-05-04 DIAGNOSIS — Z74.09 DECREASED STRENGTH, ENDURANCE, AND MOBILITY: Primary | ICD-10-CM

## 2022-05-04 DIAGNOSIS — R68.89 DECREASED STRENGTH, ENDURANCE, AND MOBILITY: Primary | ICD-10-CM

## 2022-05-04 PROCEDURE — 97110 THERAPEUTIC EXERCISES: CPT

## 2022-05-04 NOTE — PROGRESS NOTES
"  [facility]  Physical Therapy treatment note      Name: Kal Todd  Clinic Number: [id number]    Therapy Diagnosis:   Encounter Diagnoses   Name Primary?    Decreased strength, endurance, and mobility Yes    Decreased range of motion      Physician: Bianka Gonzales NP  Visit Date: 5/4/2022  Physician Orders: PT Eval and Treat  Medical Diagnosis from Referral: General muscle weakness  Evaluation Date: 9/30/2021  Authorization Period Expiration: 12/31/22  Plan of Care Expiration: 05/10/22                          Progress Update: 04/10/22                      FOTO: 1 / 3   Visit # / Visits authorized: 8/ 20 (+1 eval)                          PRECAUTIONS: Standard Precautions and Safety/fall precautions      MD Visit: No follow up scheduled      Time In:0700  Time Out: 0745  Total Appointment Time (timed & untimed codes): 40 minutes    SUBJECTIVE     Pt reports: decrease back pain after taking medication on Tuesday. When to urgent care to get shot to hel with back pain.    Compliance with Hep: Every Other Day  Response to previous treatment: no adverse reactions to treatment/updated HEP  Functional change: Better    Pain: 3/10   Worst: 8/10  Location: Low back region   Pain Control: Medication and rest   Aggravating factors: unknown    OBJECTIVE     Objective Measures updated at progress report unless specified otherwise.    Treatment     Gym/Equipment Access: Yes; theraband   Time to Complete Exercises: increase secondary to verbal/tactile cues required     Kal received therapeutic exercises to develop strength, endurance, ROM, flexibility, and posture for (40) minutes including: x = exercises performed      TherEx Today     NuStep  x 6 mins Level 6   Calf stretches  X  3x30" (B)   Calf raises    2x10   DL shuttle     2x20    Glute bridges   X  3x10    SLR X  3x10    clamshells   X  3x10 green band     Sidelying AROM hip IR  X  2x10   Box Squat  x  3x10 w/ 15lb KB 20 in box " "  Lateral walks /Monster walks  x 4x10 yds red theraband each   SL leg press x 3x10 (B) pin 6   Heel taps   3x10 6in step    Step ups  3x10 (B) 12 in     Pallof press   x   2x10 (B)    Farm Carries      6x30 yds + 25lbs    Patient education/ Posture Education x          Kal participated in neuromuscular re-education activities to improve: Balance, Coordination, Proprioception and Posture for 0 minutes. The following activities were included:  Technique  Today Comment    Tandem Stance + KB pass  3x30" w/ 7.5lb KB front foot on foam mat   Ankle rocks   2 min on foam mat   1/2 kneeling hip stretch  2 min per hip    Steps onto bosu balls      Forward lunge onto foam mat   3x10 (B)               Plan for Next Visit: Hip stability and BLE fine motor       Home Exercises and Patient Education Provided     Education/Self-Care provided:  (included in the treatment) minutes   · Patient educated on the impairments noted above and the effects of physical therapy intervention to improve overall condition and QOL.   · Patient was educated on all the above exercise prior/during/after for proper posture, positioning, and execution for safe performance with home exercise program.   · Exercise/Activity modifications:   ? 9/30/2021: EVAL: Low      Written Home Exercises Provided: yes. Prefers: Electronic Copies  Exercises were reviewed and Kal was able to demonstrate them prior to the end of the session.  Kal demonstrated good understanding of the education provided.   1. Sitting calf raises   2. Sitting hip abduction     See EMR under Patient Instructions for exercises provided during therapy sessions.    ASSESSMENT     Kal Todd tolerated PT session well with minimal complaints discomfort secondary to muscle tightness and poor motor control. Objective findings are improving with functional mobility.  Therapy exercises were reviewed by revisiting exercises given from previous home exercise program while adding no new " exercises.  Handouts were not issued during today's visit. Kal demonstrated good understanding of new exercises and will continue to progress at home until next follow-up.       Kal is progressing well towards his goals.   Pt prognosis is Good.     Pt will continue to benefit from skilled outpatient physical therapy to address the deficits listed in the problem list box on initial evaluation, provide pt/family education and to maximize pt's level of independence in the home and community environment.     Pt's spiritual, cultural and educational needs considered and pt agreeable to plan of care and goals.    Anticipated barriers to physical therapy: Transportation     GOALS:  SHORT TERM GOALS: 4 weeks Today   1. Recent signs and systems trend is improving in order to progress towards LTG's.  PC   2. Patient will be independent with HEP in order to further progress and return to maximal function. PC   3. Pain rating at Worst: 5/10 in order to progress towards increased independence with activity.  PC   4. Patient will be able to correct postural deviations in sitting and standing, to decrease pain and promote postural awareness for injury prevention.   PC      LONG TERM GOALS: 8 weeks,     1. Patient will return to normal ADL, recreational, and work related activities with less pain and limitation.  PC   2. Patient will improve AROM to stated goals in order to return to maximal functional potential.  PC    3. Patient will improve Strength to stated goals of appropriate musculature in order to improve functional independence.  PC    4. Pain Rating at Best: 1/10 to improve Quality of Life.   PC   5. Patient will meet predicted functional outcome (FOTO) score: 40% to increase self-worth & perceived functional ability.  PC   6. Patient will have met/partially met personal goal of: be independent with ADLs and reduce assistance required to perform ADLs.   PC         PC = progressing/continue  PM= partially met  DC=  discontinue    PLAN     Continue Plan of Care (POC) and progress per patient tolerance. See Treatment section for exercise progression.    Tj Figueroa, PT, DPT

## 2022-05-18 ENCOUNTER — CLINICAL SUPPORT (OUTPATIENT)
Dept: REHABILITATION | Facility: HOSPITAL | Age: 62
End: 2022-05-18
Payer: MEDICARE

## 2022-05-18 DIAGNOSIS — R68.89 DECREASED STRENGTH, ENDURANCE, AND MOBILITY: Primary | ICD-10-CM

## 2022-05-18 DIAGNOSIS — R53.1 DECREASED STRENGTH, ENDURANCE, AND MOBILITY: Primary | ICD-10-CM

## 2022-05-18 DIAGNOSIS — Z74.09 DECREASED STRENGTH, ENDURANCE, AND MOBILITY: Primary | ICD-10-CM

## 2022-05-18 DIAGNOSIS — M25.60 DECREASED RANGE OF MOTION: ICD-10-CM

## 2022-05-18 PROCEDURE — 97112 NEUROMUSCULAR REEDUCATION: CPT

## 2022-05-18 PROCEDURE — 97110 THERAPEUTIC EXERCISES: CPT

## 2022-05-18 NOTE — PROGRESS NOTES
"  [facility]  Physical Therapy treatment note      Name: Kal Todd  Clinic Number: [id number]    Therapy Diagnosis:   Encounter Diagnoses   Name Primary?    Decreased strength, endurance, and mobility Yes    Decreased range of motion      Physician: Bianka Gonzales NP  Visit Date: 5/18/2022  Physician Orders: PT Eval and Treat  Medical Diagnosis from Referral: General muscle weakness  Evaluation Date: 9/30/2021  Authorization Period Expiration: 12/31/22  Plan of Care Expiration: 05/10/22                          Progress Update: 04/10/22                      FOTO: 1 / 3   Visit # / Visits authorized: 9/ 20 (+1 eval)                          PRECAUTIONS: Standard Precautions and Safety/fall precautions      MD Visit: No follow up scheduled      Time In:0700  Time Out: 0745  Total Appointment Time (timed & untimed codes): 40 minutes    SUBJECTIVE     Pt reports: decrease back pain   Compliance with Hep: Every Other Day  Response to previous treatment: no adverse reactions to treatment/updated HEP  Functional change: Better    Pain: 1/10   Worst: 8/10  Location: Low back region   Pain Control: Medication and rest   Aggravating factors: unknown    OBJECTIVE     Objective Measures updated at progress report unless specified otherwise.    Treatment     Gym/Equipment Access: Yes; theraband   Time to Complete Exercises: increase secondary to verbal/tactile cues required     Kal received therapeutic exercises to develop strength, endurance, ROM, flexibility, and posture for (30) minutes including: x = exercises performed      TherEx Today     NuStep  x 6 mins Level 6   Calf stretches  X  3x30" (B)   Calf raises  x  2x10   DL shuttle     2x20    Glute bridges     3x10    SLR   3x10    clamshells     3x10 green band     Sidelying AROM hip IR    2x10   Box Squat  x  3x10 w/ 15lb KB 20 in box   Lateral walks /Monster walks  x 4x10 yds red theraband each   SL leg press x 3x10 (B) pin 6 " "  Heel taps  x 3x10 6in step    Step ups x 3x10 (B) 12 in     Pallof press   x  2x10 (B)    Farm Carries      6x30 yds + 25lbs    Patient education/ Posture Education           Kal participated in neuromuscular re-education activities to improve: Balance, Coordination, Proprioception and Posture for 10 minutes. The following activities were included:  Technique  Today Comment    Tandem Stance + KB pass x 3x30" w/ 7.5lb KB front foot on foam mat   Ankle rocks   2 min on foam mat   1/2 kneeling hip stretch  2 min per hip    Steps onto bosu balls  x    Forward lunge onto foam mat   3x10 (B)               Plan for Next Visit: Hip stability and BLE fine motor       Home Exercises and Patient Education Provided     Education/Self-Care provided:  (included in the treatment) minutes   · Patient educated on the impairments noted above and the effects of physical therapy intervention to improve overall condition and QOL.   · Patient was educated on all the above exercise prior/during/after for proper posture, positioning, and execution for safe performance with home exercise program.   · Exercise/Activity modifications:   ? 9/30/2021: EVAL: Low      Written Home Exercises Provided: yes. Prefers: Electronic Copies  Exercises were reviewed and Kal was able to demonstrate them prior to the end of the session.  Kal demonstrated good understanding of the education provided.   1. Sitting calf raises   2. Sitting hip abduction     See EMR under Patient Instructions for exercises provided during therapy sessions.    ASSESSMENT     Kal Todd tolerated PT session well with minimal complaints discomfort secondary to muscle tightness and poor motor control. Objective findings are improving with functional mobility.  Therapy exercises were reviewed by revisiting exercises given from previous home exercise program while adding no new exercises.  Handouts were not issued during today's visit. Kal demonstrated good " understanding of new exercises and will continue to progress at home until next follow-up.       Kal is progressing well towards his goals.   Pt prognosis is Good.     Pt will continue to benefit from skilled outpatient physical therapy to address the deficits listed in the problem list box on initial evaluation, provide pt/family education and to maximize pt's level of independence in the home and community environment.     Pt's spiritual, cultural and educational needs considered and pt agreeable to plan of care and goals.    Anticipated barriers to physical therapy: Transportation     GOALS:  SHORT TERM GOALS: 4 weeks Today   1. Recent signs and systems trend is improving in order to progress towards LTG's.  PC   2. Patient will be independent with HEP in order to further progress and return to maximal function. PC   3. Pain rating at Worst: 5/10 in order to progress towards increased independence with activity.  PC   4. Patient will be able to correct postural deviations in sitting and standing, to decrease pain and promote postural awareness for injury prevention.   PC      LONG TERM GOALS: 8 weeks,     1. Patient will return to normal ADL, recreational, and work related activities with less pain and limitation.  PC   2. Patient will improve AROM to stated goals in order to return to maximal functional potential.  PC    3. Patient will improve Strength to stated goals of appropriate musculature in order to improve functional independence.  PC    4. Pain Rating at Best: 1/10 to improve Quality of Life.   PC   5. Patient will meet predicted functional outcome (FOTO) score: 40% to increase self-worth & perceived functional ability.  PC   6. Patient will have met/partially met personal goal of: be independent with ADLs and reduce assistance required to perform ADLs.   PC         PC = progressing/continue  PM= partially met  DC= discontinue    PLAN     Continue Plan of Care (POC) and progress per patient  tolerance. See Treatment section for exercise progression.    Tj Figueroa, PT, DPT

## 2022-05-25 ENCOUNTER — CLINICAL SUPPORT (OUTPATIENT)
Dept: REHABILITATION | Facility: HOSPITAL | Age: 62
End: 2022-05-25
Payer: MEDICARE

## 2022-05-25 DIAGNOSIS — R68.89 DECREASED STRENGTH, ENDURANCE, AND MOBILITY: Primary | ICD-10-CM

## 2022-05-25 DIAGNOSIS — R53.1 DECREASED STRENGTH, ENDURANCE, AND MOBILITY: Primary | ICD-10-CM

## 2022-05-25 DIAGNOSIS — Z74.09 DECREASED STRENGTH, ENDURANCE, AND MOBILITY: Primary | ICD-10-CM

## 2022-05-25 DIAGNOSIS — M25.60 DECREASED RANGE OF MOTION: ICD-10-CM

## 2022-05-25 PROCEDURE — 97112 NEUROMUSCULAR REEDUCATION: CPT

## 2022-05-25 PROCEDURE — 97110 THERAPEUTIC EXERCISES: CPT

## 2022-05-25 NOTE — PROGRESS NOTES
[facility]  Physical Therapy treatment note + Updated POC     Name: Kal Todd  Clinic Number: [id number]    Therapy Diagnosis:   Encounter Diagnoses   Name Primary?    Decreased strength, endurance, and mobility Yes    Decreased range of motion      Physician: Bianka Gonzales NP  Visit Date: 5/25/2022  Physician Orders: PT Eval and Treat  Medical Diagnosis from Referral: General muscle weakness  Evaluation Date: 9/30/2021  Authorization Period Expiration: 12/31/22  Plan of Care Expiration: 05/10/22  (Extend 06/25/22)                        Progress Update: 04/10/22   (Last performed 05/25/22)                FOTO: 1 / 3   Visit # / Visits authorized: 10/ 20 (+1 eval)                          PRECAUTIONS: Standard Precautions and Safety/fall precautions      MD Visit: No follow up scheduled      Time In:0720  Time Out: 0805  Total Appointment Time (timed & untimed codes): 40 minutes    SUBJECTIVE     Pt reports: decrease back pain   Compliance with Hep: Every Other Day  Response to previous treatment: no adverse reactions to treatment/updated HEP  Functional change: Better    Pain: 1/10   Worst: 8/10  Location: Low back region   Pain Control: Medication and rest   Aggravating factors: unknown    OBJECTIVE     Objective Measures updated at progress report unless specified otherwise.  STRENGTH:     L/E MMT Right  9/30/2021 Goal   Hip Flexion  3+/5 5/5 B    Hip Extension  3+/5 5/5 B   Hip Abduction  4/5 5/5 B   Hip IR 3/5 5/5 B   Hip ER 4/5 5/5 B   Knee Flexion 4/5 5/5 B   Knee Extension 4/5 5/5 B   Ankle DF 4/5 5/5 B                                      L/E MMT Left  9/30/2021 Goal   Hip Flexion  3/5 5/5 B    Hip Extension  3+/5 5/5 B   Hip Abduction  4/5 5/5 B   Hip IR 3/5 5/5 B   Hip ER 4/5 5/5 B   Knee Flexion 4/5 5/5 B   Knee Extension 4/5 5/5 B   Ankle DF 4/5 5/5 B                                      Movement Analysis:   Functional Test  Outcome   Double Leg Squat  One arm  "required to maintain balance    Single Leg Step Down         Treatment     Gym/Equipment Access: Yes; theraband   Time to Complete Exercises: increase secondary to verbal/tactile cues required     Kal received therapeutic exercises to develop strength, endurance, ROM, flexibility, and posture for (30) minutes including: x = exercises performed      TherEx Today     NuStep  x 6 mins Level 6   Calf stretches  X  3x30" (B)   Calf raises    2x10   DL shuttle     2x20    Glute bridges  x   3x10    SLR   3x10    clamshells   x  3x10 green band     Sidelying AROM hip IR    2x10   Box Squat  x  3x10 w/ 15lb KB 20 in box   Lateral walks /Monster walks   4x10 yds red theraband each   SL leg press x 3x10 (B) pin 6   Heel taps  x 3x10 6in step    Step ups x 3x10 (B) 12 in     Pallof press   x  2x10 (B)    Farm Carries      6x30 yds + 25lbs    Patient education/ Posture Education           Kal participated in neuromuscular re-education activities to improve: Balance, Coordination, Proprioception and Posture for 10 minutes. The following activities were included:  Technique  Today Comment    Tandem Stance + KB pass  3x30" w/ 7.5lb KB front foot on foam mat   Ankle rocks   2 min on foam mat   1/2 kneeling hip stretch  2 min per hip    Steps onto bosu balls  x    Forward lunge onto foam mat   3x10 (B)   Open books  X  10x (B)          Plan for Next Visit: Hip stability and BLE fine motor       Home Exercises and Patient Education Provided     Education/Self-Care provided:  (included in the treatment) minutes   · Patient educated on the impairments noted above and the effects of physical therapy intervention to improve overall condition and QOL.   · Patient was educated on all the above exercise prior/during/after for proper posture, positioning, and execution for safe performance with home exercise program.   · Exercise/Activity modifications:   ? 9/30/2021: EVAL: Low      Written Home Exercises Provided: yes. Prefers: " Electronic Copies  Exercises were reviewed and Kal was able to demonstrate them prior to the end of the session.  Kal demonstrated good understanding of the education provided.   1. Sitting calf raises   2. Sitting hip abduction     See EMR under Patient Instructions for exercises provided during therapy sessions.    ASSESSMENT     Kal Todd tolerated PT session well with minimal complaints discomfort secondary to muscle tightness and poor motor control. Objective findings are improving with functional mobility.  Therapy exercises were reviewed by revisiting exercises given from previous home exercise program while adding no new exercises.  Handouts were not issued during today's visit. Kal demonstrated good understanding of new exercises and will continue to progress at home until next follow-up.       Kal is progressing well towards his goals.   Pt prognosis is Good.     Pt will continue to benefit from skilled outpatient physical therapy to address the deficits listed in the problem list box on initial evaluation, provide pt/family education and to maximize pt's level of independence in the home and community environment.     Pt's spiritual, cultural and educational needs considered and pt agreeable to plan of care and goals.    Anticipated barriers to physical therapy: Transportation     GOALS:  SHORT TERM GOALS: 4 weeks Today   1. Recent signs and systems trend is improving in order to progress towards LTG's.  PC   2. Patient will be independent with HEP in order to further progress and return to maximal function. PC   3. Pain rating at Worst: 5/10 in order to progress towards increased independence with activity.  PC   4. Patient will be able to correct postural deviations in sitting and standing, to decrease pain and promote postural awareness for injury prevention.   PC      LONG TERM GOALS: 8 weeks,     1. Patient will return to normal ADL, recreational, and work related activities with  less pain and limitation.  PC   2. Patient will improve AROM to stated goals in order to return to maximal functional potential.  PC    3. Patient will improve Strength to stated goals of appropriate musculature in order to improve functional independence.  PC    4. Pain Rating at Best: 1/10 to improve Quality of Life.   PC   5. Patient will meet predicted functional outcome (FOTO) score: 40% to increase self-worth & perceived functional ability.  PC   6. Patient will have met/partially met personal goal of: be independent with ADLs and reduce assistance required to perform ADLs.   PC         PC = progressing/continue  PM= partially met  DC= discontinue    PLAN     Continue Plan of Care (POC) and progress per patient tolerance. See Treatment section for exercise progression.    (Extend 06/25/22)    Tj Figueroa, PT, DPT

## 2022-06-14 ENCOUNTER — CLINICAL SUPPORT (OUTPATIENT)
Dept: REHABILITATION | Facility: HOSPITAL | Age: 62
End: 2022-06-14
Payer: MEDICARE

## 2022-06-14 DIAGNOSIS — R68.89 DECREASED STRENGTH, ENDURANCE, AND MOBILITY: Primary | ICD-10-CM

## 2022-06-14 DIAGNOSIS — R53.1 DECREASED STRENGTH, ENDURANCE, AND MOBILITY: Primary | ICD-10-CM

## 2022-06-14 DIAGNOSIS — M25.60 DECREASED RANGE OF MOTION: ICD-10-CM

## 2022-06-14 DIAGNOSIS — Z74.09 DECREASED STRENGTH, ENDURANCE, AND MOBILITY: Primary | ICD-10-CM

## 2022-06-14 PROCEDURE — 97110 THERAPEUTIC EXERCISES: CPT | Mod: CQ

## 2022-06-14 PROCEDURE — 97112 NEUROMUSCULAR REEDUCATION: CPT | Mod: CQ

## 2022-06-14 NOTE — PROGRESS NOTES
[facility]  Physical Therapy treatment note      Name: Kal Todd  Clinic Number: [id number]    Therapy Diagnosis:   Encounter Diagnoses   Name Primary?    Decreased strength, endurance, and mobility Yes    Decreased range of motion      Physician: Bianka Gonzales NP  Visit Date: 6/14/2022  Physician Orders: PT Eval and Treat  Medical Diagnosis from Referral: General muscle weakness  Evaluation Date: 9/30/2021  Authorization Period Expiration: 12/31/22  Plan of Care Expiration: 05/10/22  (Extend 06/25/22)                        Progress Update: 04/10/22   (Last performed 05/25/22)                FOTO: 1 / 3   Visit # / Visits authorized: 11/20 (+1 eval)                          PRECAUTIONS: Standard Precautions and Safety/fall precautions      MD Visit: No follow up scheduled      Time In: 1115  Time Out: 1200  Total Appointment Time (timed & untimed codes): 45 minutes    SUBJECTIVE     Pt reports: that he is feeling pretty good today. Will need to contact his doctor regarding his recent shortness of breath that has continued to get worse for the last 3 weeks. Blood pressure at home has been normal. Patient also states he gets dizzy when he sits up from a lying position.     Compliance with Hep: Every Other Day     Response to previous treatment: no adverse reactions to treatment/updated HOME EXERCISE PROGRAM    Functional change: able to bend forward better, short of breath recently, limited walking distance and shortness of breath with taking a shower.     Pain: 0/10   Worst: 8/10  Location: Low back region (no pain today)  Pain Control: Medication and rest   Aggravating factors: unknown    OBJECTIVE     Objective Measures updated at progress report unless specified otherwise.    Treatment     Gym/Equipment Access: Yes; theraband   Time to Complete Exercises: increase secondary to verbal/tactile cues required     Kal received therapeutic exercises to develop strength,  "endurance, ROM, flexibility, and posture for (35) minutes including: x = exercises performed      TherEx Today     NuStep  x 6 minutes Level 6   Calf stretches    3 x 30 seconds (Bilateral)   Calf raises    2 x 10   Double lower extremity  shuttle     2 x 20   Glute bridges  x   3 x 10   Straight leg raise with abdominal brace  x  3 x 10 (Bilateral)    clamshells   x  3 x 10 green band     Sidelying active range of motion  hip internal rotation     2 x 10   Box Squat   3 x 10 w/ 15lb KB 20 in box   Lateral walks /Monster walks   4 x 10 yds red theraband each   SL leg press  3 x 10 (Bilateral) pin 6   Heel taps   3 x 10 6in step    Step ups  3 x 10 (Bilateral) 12 in     Pallof press     2 x 10 (Bilateral)    Farm Carries      6 x 30 yds + 25lbs    Patient education/ Posture Education         Blood pressure during this session = 136/96    Kal participated in neuromuscular re-education activities to improve: Balance, Coordination, Proprioception and Posture for 10 minutes. The following activities were included:  Technique  Today Comment    Tandem Stance + KB pass  3x30" w/ 7.5lb KB front foot on foam mat   Ankle rocks   2 min on foam mat   1/2 kneeling hip stretch  2 min per hip    Steps onto bosu balls      Forward lunge onto foam mat   3x10 (Bilateral)   Open books   x 12x (Bilateral)          Plan for Next Visit: Hip stability and BLE fine motor       Home Exercises and Patient Education Provided     Education/Self-Care provided:  (included in the treatment) minutes   · Patient educated on the impairments noted above and the effects of physical therapy intervention to improve overall condition and QOL.   · Patient was educated on all the above exercise prior/during/after for proper posture, positioning, and execution for safe performance with home exercise program.   · Exercise/Activity modifications:   ? 9/30/2021: EVAL: Low     · 6/14/2022: advised patient to contact his cardiologist regarding above subjective " and to continue to check is blood pressure at home 2 times per day.      Written Home Exercises Provided: yes. Prefers: Electronic Copies  Exercises were reviewed and Kal was able to demonstrate them prior to the end of the session.  Kal demonstrated good understanding of the education provided.   1. Sitting calf raises   2. Sitting hip abduction     See EMR under Patient Instructions for exercises provided during therapy sessions.    ASSESSMENT     Kal Todd tolerated PT session well with no complaints discomfort. Exercises this session were limited to mat exercises due to above subjective, he was able to demonstrate an improvement in thoracic mobility with the progression of the open book exercise. Patient with good understanding of instructions for him to contact his cardiologist.      Kal is progressing well towards his goals.   Pt prognosis is Good.     Pt will continue to benefit from skilled outpatient physical therapy to address the deficits listed in the problem list box on initial evaluation, provide pt/family education and to maximize pt's level of independence in the home and community environment.     Pt's spiritual, cultural and educational needs considered and pt agreeable to plan of care and goals.    Anticipated barriers to physical therapy: Transportation     GOALS:  SHORT TERM GOALS: 4 weeks Today   1. Recent signs and systems trend is improving in order to progress towards LTG's.  PC   2. Patient will be independent with HEP in order to further progress and return to maximal function. PC   3. Pain rating at Worst: 5/10 in order to progress towards increased independence with activity.  PC   4. Patient will be able to correct postural deviations in sitting and standing, to decrease pain and promote postural awareness for injury prevention.   PC      LONG TERM GOALS: 8 weeks,     1. Patient will return to normal ADL, recreational, and work related activities with less pain and  limitation.  PC   2. Patient will improve AROM to stated goals in order to return to maximal functional potential.  PC    3. Patient will improve Strength to stated goals of appropriate musculature in order to improve functional independence.  PC    4. Pain Rating at Best: 1/10 to improve Quality of Life.   PC   5. Patient will meet predicted functional outcome (FOTO) score: 40% to increase self-worth & perceived functional ability.  PC   6. Patient will have met/partially met personal goal of: be independent with ADLs and reduce assistance required to perform ADLs.   PC         PC = progressing/continue  PM= partially met  DC= discontinue    PLAN     Continue Plan of Care (POC) and progress per patient tolerance. See Treatment section for exercise progression.    (Extend 06/25/22)    Mihir Whitaker, PTA

## 2022-07-12 ENCOUNTER — DOCUMENTATION ONLY (OUTPATIENT)
Dept: REHABILITATION | Facility: HOSPITAL | Age: 62
End: 2022-07-12
Payer: MEDICARE

## 2022-07-12 NOTE — PROGRESS NOTES
PT/PTA met face to face to discuss pt's treatment plan and progress towards established goals. Pt will be seen by a physical therapist minimally every 6th visit or every 30 days.        Mihir Whitaker PTA

## 2022-08-24 ENCOUNTER — HOSPITAL ENCOUNTER (EMERGENCY)
Facility: HOSPITAL | Age: 62
Discharge: HOME | End: 2022-08-25
Attending: EMERGENCY MEDICINE
Payer: MEDICARE

## 2022-08-24 DIAGNOSIS — R73.9 HYPERGLYCEMIA: Primary | ICD-10-CM

## 2022-08-24 LAB
ALBUMIN SERPL BCP-MCNC: 4.5 G/DL (ref 3.5–5.2)
ALP SERPL-CCNC: 104 U/L (ref 55–135)
ALT SERPL W/O P-5'-P-CCNC: 23 U/L (ref 10–44)
ANION GAP SERPL CALC-SCNC: 17 MMOL/L (ref 8–16)
AST SERPL-CCNC: 19 U/L (ref 10–40)
BASOPHILS # BLD AUTO: 0.05 K/UL (ref 0–0.2)
BASOPHILS NFR BLD: 0.7 % (ref 0–1.9)
BILIRUB SERPL-MCNC: 0.5 MG/DL (ref 0.1–1)
BUN SERPL-MCNC: 25 MG/DL (ref 6–20)
CALCIUM SERPL-MCNC: 10.4 MG/DL (ref 8.7–10.5)
CHLORIDE SERPL-SCNC: 95 MMOL/L (ref 95–110)
CO2 SERPL-SCNC: 25 MMOL/L (ref 23–29)
CREAT SERPL-MCNC: 1.6 MG/DL (ref 0.5–1.4)
DIFFERENTIAL METHOD: ABNORMAL
EOSINOPHIL # BLD AUTO: 0.1 K/UL (ref 0–0.5)
EOSINOPHIL NFR BLD: 1.6 % (ref 0–8)
ERYTHROCYTE [DISTWIDTH] IN BLOOD BY AUTOMATED COUNT: 14.3 % (ref 11.5–14.5)
EST. GFR  (NO RACE VARIABLE): 49 ML/MIN/1.73 M^2
GLUCOSE SERPL-MCNC: 436 MG/DL (ref 70–110)
HCT VFR BLD AUTO: 40.3 % (ref 40–54)
HGB BLD-MCNC: 13 G/DL (ref 14–18)
IMM GRANULOCYTES # BLD AUTO: 0.02 K/UL (ref 0–0.04)
IMM GRANULOCYTES NFR BLD AUTO: 0.3 % (ref 0–0.5)
LYMPHOCYTES # BLD AUTO: 3.5 K/UL (ref 1–4.8)
LYMPHOCYTES NFR BLD: 50.5 % (ref 18–48)
MCH RBC QN AUTO: 24.6 PG (ref 27–31)
MCHC RBC AUTO-ENTMCNC: 32.3 G/DL (ref 32–36)
MCV RBC AUTO: 76 FL (ref 82–98)
MONOCYTES # BLD AUTO: 0.5 K/UL (ref 0.3–1)
MONOCYTES NFR BLD: 7.6 % (ref 4–15)
NEUTROPHILS # BLD AUTO: 2.8 K/UL (ref 1.8–7.7)
NEUTROPHILS NFR BLD: 39.3 % (ref 38–73)
NRBC BLD-RTO: 0 /100 WBC
PLATELET # BLD AUTO: 229 K/UL (ref 150–450)
PMV BLD AUTO: 9.7 FL (ref 9.2–12.9)
POCT GLUCOSE: 464 MG/DL (ref 70–110)
POTASSIUM SERPL-SCNC: 3.9 MMOL/L (ref 3.5–5.1)
PROT SERPL-MCNC: 8.8 G/DL (ref 6–8.4)
RBC # BLD AUTO: 5.29 M/UL (ref 4.6–6.2)
SODIUM SERPL-SCNC: 137 MMOL/L (ref 136–145)
WBC # BLD AUTO: 6.99 K/UL (ref 3.9–12.7)

## 2022-08-24 PROCEDURE — 96361 HYDRATE IV INFUSION ADD-ON: CPT

## 2022-08-24 PROCEDURE — 63600175 PHARM REV CODE 636 W HCPCS: Performed by: EMERGENCY MEDICINE

## 2022-08-24 PROCEDURE — 85025 COMPLETE CBC W/AUTO DIFF WBC: CPT | Performed by: EMERGENCY MEDICINE

## 2022-08-24 PROCEDURE — 82962 GLUCOSE BLOOD TEST: CPT | Mod: 91

## 2022-08-24 PROCEDURE — 25000003 PHARM REV CODE 250: Performed by: EMERGENCY MEDICINE

## 2022-08-24 PROCEDURE — 99284 EMERGENCY DEPT VISIT MOD MDM: CPT | Mod: 25

## 2022-08-24 PROCEDURE — 96374 THER/PROPH/DIAG INJ IV PUSH: CPT

## 2022-08-24 PROCEDURE — 80053 COMPREHEN METABOLIC PANEL: CPT | Performed by: EMERGENCY MEDICINE

## 2022-08-24 RX ADMIN — INSULIN HUMAN 10 UNITS: 100 INJECTION, SOLUTION PARENTERAL at 10:08

## 2022-08-24 RX ADMIN — SODIUM CHLORIDE 1000 ML: 0.9 INJECTION, SOLUTION INTRAVENOUS at 10:08

## 2022-08-25 VITALS
SYSTOLIC BLOOD PRESSURE: 146 MMHG | OXYGEN SATURATION: 98 % | RESPIRATION RATE: 18 BRPM | TEMPERATURE: 98 F | BODY MASS INDEX: 40.23 KG/M2 | HEIGHT: 67 IN | WEIGHT: 256.31 LBS | DIASTOLIC BLOOD PRESSURE: 74 MMHG | HEART RATE: 69 BPM

## 2022-08-25 LAB — POCT GLUCOSE: 299 MG/DL (ref 70–110)

## 2022-08-25 NOTE — ED PROVIDER NOTES
SCRIBE #1 NOTE: I, Shin Shepherd, am scribing for, and in the presence of, Juana Jacobsen MD. I have scribed the entire note.       History     Chief Complaint   Patient presents with    Hyperglycemia     Pt states he started metformin 1 week ago. C/o high blood sugars since Friday. Denies dietary changes since beginning metformin. Glu 464 in triage. C/o dizziness, weakness, nausea     Review of patient's allergies indicates:   Allergen Reactions    Lisinopril Nausea And Vomiting         History of Present Illness     HPI    8/24/2022, 10:04 PM  History obtained from the patient      History of Present Illness: Kal Todd is a 62 y.o. male patient with a PMHx of CHF who presents to the Emergency Department for evaluation of hyperglycemia which onset gradually x 5 days. Pt recently found out he is diabetic and has begin taking metformin. He is unsure how to use his at home testing kits and has not been testing. Symptoms are constant and moderate in severity. No mitigating or exacerbating factors reported. Associated sxs include dizziness and weakness. Patient denies any f/c, cough, HA, CP, SOB, and all other sxs at this time. No prior Tx reported. No further complaints or concerns at this time.       Arrival mode: Personal vehicle     PCP: Bianka Gonzales NP        Past Medical History:  No past medical history on file.    Past Surgical History:  No past surgical history on file.      Family History:  No family history on file.    Social History:  Social History     Tobacco Use    Smoking status: Not on file    Smokeless tobacco: Not on file   Substance and Sexual Activity    Alcohol use: Not on file    Drug use: Not on file    Sexual activity: Not on file        Review of Systems     Review of Systems   Constitutional: Negative for chills and fever.   HENT: Negative for sore throat.    Respiratory: Negative for cough and shortness of breath.    Cardiovascular: Negative for chest pain.   Gastrointestinal:  "Negative for nausea.   Genitourinary: Negative for dysuria.   Musculoskeletal: Negative for back pain.   Skin: Negative for rash.   Neurological: Positive for dizziness and weakness. Negative for headaches.   Hematological: Does not bruise/bleed easily.   All other systems reviewed and are negative.       Physical Exam     Initial Vitals [08/24/22 1849]   BP Pulse Resp Temp SpO2   137/69 81 19 98.3 °F (36.8 °C) 97 %      MAP       --          Physical Exam  Nursing Notes and Vital Signs Reviewed.  Constitutional: Patient is in no apparent distress. Well-developed and well-nourished.  Head: Atraumatic. Normocephalic.  Eyes: PERRL. EOM intact. Conjunctivae are not pale. No scleral icterus.  ENT: Mucous membranes are moist. Oropharynx is clear and symmetric.    Neck: Supple. Full ROM. No lymphadenopathy.  Cardiovascular: Regular rate. Regular rhythm. No murmurs, rubs, or gallops. Distal pulses are 2+ and symmetric.  Pulmonary/Chest: No respiratory distress. Clear to auscultation bilaterally. No wheezing or rales.  Abdominal: Soft and non-distended.  There is no tenderness.  No rebound, guarding, or rigidity. Good bowel sounds.  Genitourinary: No CVA tenderness  Musculoskeletal: Moves all extremities. No obvious deformities. No edema. No calf tenderness.  Skin: Warm and dry.  Neurological:  Alert, awake, and appropriate.  Normal speech.  No acute focal neurological deficits are appreciated.  Psychiatric: Normal affect. Good eye contact. Appropriate in content.     ED Course   Procedures  ED Vital Signs:  Vitals:    08/24/22 1845 08/24/22 1849 08/24/22 2159 08/24/22 2200   BP:  137/69  (!) 157/74   Pulse:  81  73   Resp:  19  16   Temp:  98.3 °F (36.8 °C)  98 °F (36.7 °C)   TempSrc:  Oral  Oral   SpO2:  97%  99%   Weight: 116.3 kg (256 lb 4.6 oz)      Height:   5' 7" (1.702 m)     08/24/22 2211 08/25/22 0000   BP: (!) 150/68 (!) 146/74   Pulse: 70 69   Resp: 18    Temp:  98.2 °F (36.8 °C)   TempSrc:  Oral   SpO2: 98% 98% "   Weight:     Height:         Abnormal Lab Results:  Labs Reviewed   CBC W/ AUTO DIFFERENTIAL - Abnormal; Notable for the following components:       Result Value    Hemoglobin 13.0 (*)     MCV 76 (*)     MCH 24.6 (*)     Lymph % 50.5 (*)     All other components within normal limits   COMPREHENSIVE METABOLIC PANEL - Abnormal; Notable for the following components:    Glucose 436 (*)     BUN 25 (*)     Creatinine 1.6 (*)     Total Protein 8.8 (*)     Anion Gap 17 (*)     eGFR 49 (*)     All other components within normal limits   POCT GLUCOSE - Abnormal; Notable for the following components:    POCT Glucose 464 (*)     All other components within normal limits   POCT GLUCOSE - Abnormal; Notable for the following components:    POCT Glucose 299 (*)     All other components within normal limits   BETA - HYDROXYBUTYRATE, SERUM        All Lab Results:  Results for orders placed or performed during the hospital encounter of 08/24/22   CBC auto differential   Result Value Ref Range    WBC 6.99 3.90 - 12.70 K/uL    RBC 5.29 4.60 - 6.20 M/uL    Hemoglobin 13.0 (L) 14.0 - 18.0 g/dL    Hematocrit 40.3 40.0 - 54.0 %    MCV 76 (L) 82 - 98 fL    MCH 24.6 (L) 27.0 - 31.0 pg    MCHC 32.3 32.0 - 36.0 g/dL    RDW 14.3 11.5 - 14.5 %    Platelets 229 150 - 450 K/uL    MPV 9.7 9.2 - 12.9 fL    Immature Granulocytes 0.3 0.0 - 0.5 %    Gran # (ANC) 2.8 1.8 - 7.7 K/uL    Immature Grans (Abs) 0.02 0.00 - 0.04 K/uL    Lymph # 3.5 1.0 - 4.8 K/uL    Mono # 0.5 0.3 - 1.0 K/uL    Eos # 0.1 0.0 - 0.5 K/uL    Baso # 0.05 0.00 - 0.20 K/uL    nRBC 0 0 /100 WBC    Gran % 39.3 38.0 - 73.0 %    Lymph % 50.5 (H) 18.0 - 48.0 %    Mono % 7.6 4.0 - 15.0 %    Eosinophil % 1.6 0.0 - 8.0 %    Basophil % 0.7 0.0 - 1.9 %    Differential Method Automated    Comprehensive metabolic panel   Result Value Ref Range    Sodium 137 136 - 145 mmol/L    Potassium 3.9 3.5 - 5.1 mmol/L    Chloride 95 95 - 110 mmol/L    CO2 25 23 - 29 mmol/L    Glucose 436 (H) 70 - 110  mg/dL    BUN 25 (H) 6 - 20 mg/dL    Creatinine 1.6 (H) 0.5 - 1.4 mg/dL    Calcium 10.4 8.7 - 10.5 mg/dL    Total Protein 8.8 (H) 6.0 - 8.4 g/dL    Albumin 4.5 3.5 - 5.2 g/dL    Total Bilirubin 0.5 0.1 - 1.0 mg/dL    Alkaline Phosphatase 104 55 - 135 U/L    AST 19 10 - 40 U/L    ALT 23 10 - 44 U/L    Anion Gap 17 (H) 8 - 16 mmol/L    eGFR 49 (A) >60 mL/min/1.73 m^2   POCT glucose   Result Value Ref Range    POCT Glucose 464 (HH) 70 - 110 mg/dL   POCT glucose   Result Value Ref Range    POCT Glucose 299 (H) 70 - 110 mg/dL         Imaging Results:  Imaging Results    None                     The Emergency Provider reviewed the vital signs and test results, which are outlined above.     ED Discussion       12:13 AM: Reassessed pt at this time. Discussed with pt all pertinent ED information and results. Discussed pt dx and plan of tx. Gave pt all f/u and return to the ED instructions. All questions and concerns were addressed at this time. Pt expresses understanding of information and instructions, and is comfortable with plan to discharge. Pt is stable for discharge.    I discussed with patient and/or family/caretaker that evaluation in the ED does not suggest any emergent or life threatening medical conditions requiring immediate intervention beyond what was provided in the ED, and I believe patient is safe for discharge.  Regardless, an unremarkable evaluation in the ED does not preclude the development or presence of a serious of life threatening condition. As such, patient was instructed to return immediately for any worsening or change in current symptoms.         Medical Decision Making:   Clinical Tests:   Lab Tests: Ordered and Reviewed           ED Medication(s):  Medications   sodium chloride 0.9% bolus 1,000 mL (0 mLs Intravenous Stopped 8/24/22 2307)   insulin regular injection 10 Units 0.1 mL (10 Units Intravenous Given 8/24/22 2204)       There are no discharge medications for this patient.       Follow-up  Information     Bianka Gonzales NP In 1 day.    Specialty: Family Medicine  Contact information:  [address] [facility] [address]  [phone]             [facility] - Emergency Dept..    Specialty: Emergency Medicine  Why: As needed, If symptoms worsen  Contact information:  [address] [facility] [address] [phone]                           Scribe Attestation:   Scribe #1: I performed the above scribed service and the documentation accurately describes the services I performed. I attest to the accuracy of the note.     Attending:   Physician Attestation Statement for Scribe #1: I, Juana Jacobsen MD, personally performed the services described in this documentation, as scribed by Shin Shepherd, in my presence, and it is both accurate and complete.           Clinical Impression       ICD-10-CM ICD-9-CM   1. Hyperglycemia  R73.9 790.29       Disposition:   Disposition: Discharged  Condition: Stable         Juana Jacobsen MD  08/25/22 0608

## 2022-08-25 NOTE — ED NOTES
Pt reports new diabetic and is not aware of how to use his insulin pen. Pt PCP advised pt to come to ED for eval of elevated BS. Reports intermittent dizziness and weakness. Pt denies nausea or vomiting. Placed on monitor IV fluids pending.

## 2024-10-20 ENCOUNTER — HOSPITAL ENCOUNTER (EMERGENCY)
Facility: HOSPITAL | Age: 64
Discharge: HOME | End: 2024-10-20
Attending: EMERGENCY MEDICINE
Payer: MEDICARE

## 2024-10-20 VITALS
HEART RATE: 56 BPM | DIASTOLIC BLOOD PRESSURE: 65 MMHG | TEMPERATURE: 98 F | HEIGHT: 67 IN | BODY MASS INDEX: 38.89 KG/M2 | SYSTOLIC BLOOD PRESSURE: 130 MMHG | OXYGEN SATURATION: 99 % | WEIGHT: 247.81 LBS | RESPIRATION RATE: 16 BRPM

## 2024-10-20 DIAGNOSIS — N62 GYNECOMASTIA, MALE: Primary | ICD-10-CM

## 2024-10-20 LAB
ALBUMIN SERPL BCP-MCNC: 4.1 G/DL (ref 3.5–5.2)
ALP SERPL-CCNC: 74 U/L (ref 40–150)
ALT SERPL W/O P-5'-P-CCNC: 30 U/L (ref 10–44)
ANION GAP SERPL CALC-SCNC: 11 MMOL/L (ref 8–16)
AST SERPL-CCNC: 22 U/L (ref 10–40)
BASOPHILS # BLD AUTO: 0.04 K/UL (ref 0–0.2)
BASOPHILS NFR BLD: 0.5 % (ref 0–1.9)
BILIRUB SERPL-MCNC: 0.4 MG/DL (ref 0.1–1)
BUN SERPL-MCNC: 17 MG/DL (ref 8–23)
CALCIUM SERPL-MCNC: 9.6 MG/DL (ref 8.7–10.5)
CHLORIDE SERPL-SCNC: 109 MMOL/L (ref 95–110)
CO2 SERPL-SCNC: 21 MMOL/L (ref 23–29)
CREAT SERPL-MCNC: 1.1 MG/DL (ref 0.5–1.4)
DIFFERENTIAL METHOD BLD: ABNORMAL
EOSINOPHIL # BLD AUTO: 0.2 K/UL (ref 0–0.5)
EOSINOPHIL NFR BLD: 2.1 % (ref 0–8)
ERYTHROCYTE [DISTWIDTH] IN BLOOD BY AUTOMATED COUNT: 13.3 % (ref 11.5–14.5)
EST. GFR  (NO RACE VARIABLE): >60 ML/MIN/1.73 M^2
GLUCOSE SERPL-MCNC: 86 MG/DL (ref 70–110)
HCT VFR BLD AUTO: 35.8 % (ref 40–54)
HCV AB SERPL QL IA: NEGATIVE
HEP C VIRUS HOLD SPECIMEN: NORMAL
HGB BLD-MCNC: 11 G/DL (ref 14–18)
HIV 1+2 AB+HIV1 P24 AG SERPL QL IA: NEGATIVE
IMM GRANULOCYTES # BLD AUTO: 0.02 K/UL (ref 0–0.04)
IMM GRANULOCYTES NFR BLD AUTO: 0.2 % (ref 0–0.5)
LYMPHOCYTES # BLD AUTO: 3.5 K/UL (ref 1–4.8)
LYMPHOCYTES NFR BLD: 42.7 % (ref 18–48)
MCH RBC QN AUTO: 25.4 PG (ref 27–31)
MCHC RBC AUTO-ENTMCNC: 30.7 G/DL (ref 32–36)
MCV RBC AUTO: 83 FL (ref 82–98)
MONOCYTES # BLD AUTO: 0.7 K/UL (ref 0.3–1)
MONOCYTES NFR BLD: 8.6 % (ref 4–15)
NEUTROPHILS # BLD AUTO: 3.7 K/UL (ref 1.8–7.7)
NEUTROPHILS NFR BLD: 45.9 % (ref 38–73)
NRBC BLD-RTO: 0 /100 WBC
PLATELET # BLD AUTO: 208 K/UL (ref 150–450)
PMV BLD AUTO: 10 FL (ref 9.2–12.9)
POTASSIUM SERPL-SCNC: 4.3 MMOL/L (ref 3.5–5.1)
PROT SERPL-MCNC: 7.8 G/DL (ref 6–8.4)
RBC # BLD AUTO: 4.33 M/UL (ref 4.6–6.2)
SODIUM SERPL-SCNC: 141 MMOL/L (ref 136–145)
WBC # BLD AUTO: 8.13 K/UL (ref 3.9–12.7)

## 2024-10-20 PROCEDURE — 86803 HEPATITIS C AB TEST: CPT | Performed by: EMERGENCY MEDICINE

## 2024-10-20 PROCEDURE — 87389 HIV-1 AG W/HIV-1&-2 AB AG IA: CPT | Performed by: EMERGENCY MEDICINE

## 2024-10-20 PROCEDURE — 80053 COMPREHEN METABOLIC PANEL: CPT

## 2024-10-20 PROCEDURE — 99283 EMERGENCY DEPT VISIT LOW MDM: CPT

## 2024-10-20 PROCEDURE — 36415 COLL VENOUS BLD VENIPUNCTURE: CPT

## 2024-10-20 PROCEDURE — 84146 ASSAY OF PROLACTIN: CPT

## 2024-10-20 PROCEDURE — 85025 COMPLETE CBC W/AUTO DIFF WBC: CPT

## 2024-10-20 RX ORDER — ROSUVASTATIN CALCIUM 40 MG/1
40 TABLET, COATED ORAL NIGHTLY
COMMUNITY

## 2024-10-20 RX ORDER — TAMSULOSIN HYDROCHLORIDE 0.4 MG/1
CAPSULE ORAL DAILY
COMMUNITY

## 2024-10-20 RX ORDER — ASPIRIN 650 MG
650 TABLET, DELAYED RELEASE (ENTERIC COATED) ORAL EVERY 6 HOURS PRN
COMMUNITY

## 2024-10-20 RX ORDER — HYDROCODONE BITARTRATE AND ACETAMINOPHEN 10; 325 MG/1; MG/1
1 TABLET ORAL
COMMUNITY

## 2024-10-20 RX ORDER — METHOCARBAMOL 500 MG/1
500 TABLET, FILM COATED ORAL 4 TIMES DAILY
COMMUNITY

## 2024-10-20 RX ORDER — POTASSIUM CHLORIDE 600 MG/1
8 TABLET, FILM COATED, EXTENDED RELEASE ORAL 2 TIMES DAILY
COMMUNITY

## 2024-10-20 RX ORDER — FUROSEMIDE 40 MG/5ML
SOLUTION ORAL DAILY
COMMUNITY

## 2024-10-20 RX ORDER — GABAPENTIN 100 MG/1
100 CAPSULE ORAL 3 TIMES DAILY
COMMUNITY

## 2024-10-20 RX ORDER — SACUBITRIL AND VALSARTAN 24; 26 MG/1; MG/1
1 TABLET, FILM COATED ORAL 2 TIMES DAILY
COMMUNITY

## 2024-10-20 RX ORDER — ERGOCALCIFEROL 1.25 MG/1
50000 CAPSULE ORAL
COMMUNITY

## 2024-10-20 RX ORDER — CARVEDILOL PHOSPHATE 20 MG/1
20 CAPSULE, EXTENDED RELEASE ORAL DAILY
COMMUNITY

## 2024-10-20 RX ORDER — LORATADINE 10 MG
10 TABLET,DISINTEGRATING ORAL DAILY
COMMUNITY

## 2024-10-21 LAB — PROLACTIN SERPL IA-MCNC: 6.9 NG/ML (ref 3.5–19.4)

## 2024-10-21 NOTE — ED PROVIDER NOTES
Encounter Date: 10/20/2024       History     Chief Complaint   Patient presents with    Breast Pain     C/o swollen, sore breasts. States nipples are swollen and tender to touch.      64-year-old male presents to the emergency department chief complaint of bilateral breast pain.  The patient reports that these symptoms started about 3 years ago.  Reports symptoms have been intermittent.  Reports symptoms have been worsening.  Reports reporting the issue to his primary care provider in stating that they have done nothing to address the problem.  Reports that these symptoms have worsened since Friday.  Reports associated swelling.  He denies any nipple discharge, fevers, chills, fatigue, dizziness, lightheadedness, chest pain, shortness for breath, unexpected weight change, or any other symptoms.        Review of patient's allergies indicates:   Allergen Reactions    Lisinopril Nausea And Vomiting     History reviewed. No pertinent past medical history.  History reviewed. No pertinent surgical history.  No family history on file.  Social History     Tobacco Use    Smoking status: Never    Smokeless tobacco: Never   Substance Use Topics    Drug use: Never     Review of Systems   Constitutional:  Negative for chills, fatigue, fever and unexpected weight change.   HENT:  Negative for sore throat.    Respiratory:  Negative for shortness of breath.    Cardiovascular:  Negative for chest pain.   Gastrointestinal:  Negative for abdominal pain and nausea.   Genitourinary:  Negative for dysuria.   Musculoskeletal:  Negative for back pain.   Skin:  Negative for rash.        Breast pain   Neurological:  Negative for weakness.   Hematological:  Does not bruise/bleed easily.       Physical Exam     Initial Vitals [10/20/24 1939]   BP Pulse Resp Temp SpO2   (!) 163/75 67 18 98 °F (36.7 °C) 98 %      MAP       --       ED Course Vitals  Vitals:    10/20/24 1939 10/20/24 2134   BP: (!) 163/75 130/65   BP Location: Right arm Right arm  "  Patient Position:  Sitting   Pulse: 67 (!) 56   Resp: 18 16   Temp: 98 °F (36.7 °C)    TempSrc: Oral    SpO2: 98% 99%   Weight: 112.4 kg (247 lb 12.8 oz)    Height: 5' 7" (1.702 m)        Physical Exam    Nursing note and vitals reviewed.  Constitutional: He appears well-developed and well-nourished.   HENT:   Head: Normocephalic and atraumatic.   Right Ear: Hearing normal.   Left Ear: Hearing normal.   Nose: Nose normal. Mouth/Throat: Uvula is midline, oropharynx is clear and moist and mucous membranes are normal.   Eyes: Conjunctivae, EOM and lids are normal. Pupils are equal, round, and reactive to light.   Neck: Trachea normal. Neck supple.   Normal range of motion.  Cardiovascular:  Normal rate, regular rhythm, normal heart sounds, intact distal pulses and normal pulses.           Pulmonary/Chest: Effort normal and breath sounds normal. Right breast exhibits tenderness. Right breast exhibits no inverted nipple, no mass, no nipple discharge and no skin change. Left breast exhibits tenderness. Left breast exhibits no inverted nipple, no mass, no nipple discharge and no skin change. Breasts are symmetrical.   Abdominal: Abdomen is soft. Bowel sounds are normal. There is no abdominal tenderness. There is no rebound and no guarding.   Musculoskeletal:         General: Normal range of motion.      Cervical back: Normal, normal range of motion and neck supple.     Neurological: He is alert and oriented to person, place, and time. He has normal strength and normal reflexes. No cranial nerve deficit or sensory deficit. GCS eye subscore is 4. GCS verbal subscore is 5. GCS motor subscore is 6.   Skin: Skin is warm and dry.   Psychiatric: He has a normal mood and affect. His behavior is normal. Thought content normal.         ED Course   Procedures  Labs Reviewed   CBC W/ AUTO DIFFERENTIAL - Abnormal       Result Value    WBC 8.13      RBC 4.33 (*)     Hemoglobin 11.0 (*)     Hematocrit 35.8 (*)     MCV 83      MCH 25.4 " (*)     MCHC 30.7 (*)     RDW 13.3      Platelets 208      MPV 10.0      Immature Granulocytes 0.2      Gran # (ANC) 3.7      Immature Grans (Abs) 0.02      Lymph # 3.5      Mono # 0.7      Eos # 0.2      Baso # 0.04      nRBC 0      Gran % 45.9      Lymph % 42.7      Mono % 8.6      Eosinophil % 2.1      Basophil % 0.5      Differential Method Automated     COMPREHENSIVE METABOLIC PANEL - Abnormal    Sodium 141      Potassium 4.3      Chloride 109      CO2 21 (*)     Glucose 86      BUN 17      Creatinine 1.1      Calcium 9.6      Total Protein 7.8      Albumin 4.1      Total Bilirubin 0.4      Alkaline Phosphatase 74      AST 22      ALT 30      eGFR >60      Anion Gap 11     HIV 1 / 2 ANTIBODY    HIV 1/2 Ag/Ab Negative      Narrative:     Release to patient->Immediate   HEPATITIS C ANTIBODY    Hepatitis C Ab Negative      Narrative:     Release to patient->Immediate   HEP C VIRUS HOLD SPECIMEN    HEP C Virus Hold Specimen Hold for HCV sendout      Narrative:     Release to patient->Immediate   PROLACTIN   PROLACTIN          Imaging Results    None          Medications - No data to display  Medical Decision Making  Discussed with the patient that all lab findings today showed no indication for infection, cancers, or any acute findings, white blood cell count is normal, kidney function normal, liver function normal. The patient verbalized understanding.  Discussed with the patient that a prolactin level was sent out, discussed that we will not get that result today.  Discussed with the patient to follow up with the primary care provider regarding ongoing management.  Discussed that his primary care provider needs to do endocrine testing including testosterone to determine the cause for gynecomastia.  Discussed additionally that if no findings were evident that he would possibly need follow up ultrasound.  Clinically findings reveal simple gynecomastia, there is no erythema, discharge from nipples, masses,  retractions. There is notable swelling and tenderness noted to the bilateral breast tissue.  Patient verbalized understanding.    I discussed with patient and/or family/caretaker that evaluation in the ED does not suggest any emergent or life threatening medical conditions requiring immediate intervention beyond what was provided in the ED, and I believe patient is safe for discharge. Regardless, an unremarkable evaluation in the ED does not preclude the development or presence of a serious of life threatening condition. As such, patient was instructed to return immediately for any worsening or change in current symptoms.     Amount and/or Complexity of Data Reviewed  Labs: ordered.                                      Clinical Impression:  Final diagnoses:  [N62] Gynecomastia, male (Primary)          ED Disposition Condition    Discharge Stable          ED Prescriptions    None       Follow-up Information       Follow up With Specialties Details Why Contact Info    Bianka Gonzales, NP Family Medicine Schedule an appointment as soon as possible for a visit  endocrine eval, testosterone, pending prolactin level here, results will not be back until later date. [address] [facility] [address]  [phone]      [facility] Dept. Emergency Medicine Go to  As needed, If symptoms worsen [id number] [facility] [id number] [phone]             Jf Foreman NP  10/20/24 2206       Jf Foreman NP  10/20/24 2206

## 2024-11-13 ENCOUNTER — PATIENT MESSAGE (OUTPATIENT)
Dept: RESEARCH | Facility: HOSPITAL | Age: 64
End: 2024-11-13
Payer: MEDICARE